# Patient Record
Sex: FEMALE | Race: WHITE | URBAN - METROPOLITAN AREA
[De-identification: names, ages, dates, MRNs, and addresses within clinical notes are randomized per-mention and may not be internally consistent; named-entity substitution may affect disease eponyms.]

---

## 2018-02-06 ENCOUNTER — TRANSFERRED RECORDS (OUTPATIENT)
Dept: HEALTH INFORMATION MANAGEMENT | Facility: CLINIC | Age: 63
End: 2018-02-06

## 2018-03-27 ENCOUNTER — TRANSFERRED RECORDS (OUTPATIENT)
Dept: HEALTH INFORMATION MANAGEMENT | Facility: CLINIC | Age: 63
End: 2018-03-27

## 2018-03-29 ENCOUNTER — TRANSFERRED RECORDS (OUTPATIENT)
Dept: HEALTH INFORMATION MANAGEMENT | Facility: CLINIC | Age: 63
End: 2018-03-29

## 2018-04-04 ENCOUNTER — TRANSFERRED RECORDS (OUTPATIENT)
Dept: HEALTH INFORMATION MANAGEMENT | Facility: CLINIC | Age: 63
End: 2018-04-04

## 2018-04-17 ENCOUNTER — MEDICAL CORRESPONDENCE (OUTPATIENT)
Dept: HEALTH INFORMATION MANAGEMENT | Facility: CLINIC | Age: 63
End: 2018-04-17

## 2018-04-27 DIAGNOSIS — M51.26 LUMBAR DISC HERNIATION: Primary | ICD-10-CM

## 2018-04-29 ASSESSMENT — ENCOUNTER SYMPTOMS
MUSCLE CRAMPS: 0
BACK PAIN: 1
JOINT SWELLING: 0
NECK PAIN: 1
MYALGIAS: 1
STIFFNESS: 1
ARTHRALGIAS: 1
MUSCLE WEAKNESS: 1

## 2018-05-01 ENCOUNTER — HEALTH MAINTENANCE LETTER (OUTPATIENT)
Age: 63
End: 2018-05-01

## 2018-05-03 ENCOUNTER — OFFICE VISIT (OUTPATIENT)
Dept: NEUROSURGERY | Facility: CLINIC | Age: 63
End: 2018-05-03
Payer: COMMERCIAL

## 2018-05-03 ENCOUNTER — RADIANT APPOINTMENT (OUTPATIENT)
Dept: GENERAL RADIOLOGY | Facility: CLINIC | Age: 63
End: 2018-05-03
Attending: NEUROLOGICAL SURGERY
Payer: COMMERCIAL

## 2018-05-03 VITALS
HEART RATE: 66 BPM | HEIGHT: 61 IN | SYSTOLIC BLOOD PRESSURE: 122 MMHG | WEIGHT: 144.2 LBS | BODY MASS INDEX: 27.23 KG/M2 | DIASTOLIC BLOOD PRESSURE: 77 MMHG

## 2018-05-03 DIAGNOSIS — M43.16 SPONDYLOLISTHESIS AT L4-L5 LEVEL: Primary | ICD-10-CM

## 2018-05-03 DIAGNOSIS — M48.02 SPINAL STENOSIS IN CERVICAL REGION: Primary | ICD-10-CM

## 2018-05-03 DIAGNOSIS — M71.38 SYNOVIAL CYST OF LUMBAR FACET JOINT: ICD-10-CM

## 2018-05-03 DIAGNOSIS — M51.26 LUMBAR DISC HERNIATION: ICD-10-CM

## 2018-05-03 RX ORDER — HYDROCHLOROTHIAZIDE 25 MG/1
25 TABLET ORAL EVERY MORNING
COMMUNITY
Start: 2017-12-26

## 2018-05-03 RX ORDER — COVID-19 ANTIGEN TEST
KIT MISCELLANEOUS DAILY PRN
COMMUNITY
End: 2018-07-16

## 2018-05-03 RX ORDER — LOSARTAN POTASSIUM 50 MG/1
50 TABLET ORAL EVERY MORNING
COMMUNITY
Start: 2018-01-03

## 2018-05-03 RX ORDER — METOPROLOL TARTRATE 50 MG
50 TABLET ORAL 2 TIMES DAILY
COMMUNITY

## 2018-05-03 ASSESSMENT — PAIN SCALES - GENERAL: PAINLEVEL: MILD PAIN (3)

## 2018-05-03 NOTE — PROGRESS NOTES
Neurosurgery Clinic Note    Chief Complaint: low back and right leg pain    History of Present Illness:  It was a pleasure to evaluate Kate Hartman in clinic today at the kind referral of José Cid  [facility]  [address] [place] [id number].    Kate Hartman is a 62 year old female presenting with pain radiating to right lateral thigh and right lateral and anterior calf, exacerbated by standing and walking, relieved 50% with right L4-L5 transfemoral epidural steroid injection and relieved with sitting and rest. Patient also has long-standing bilateral hand numbness and tingling when awakening from sleep and long-standing neck pain. She has no recent cervical spine imaging.exacerbated by standing and walking, relieved 50% with right L4-L5 transforaminal epidural steroid injection and relieved with sitting and rest.  Patient also has long-standing bilateral hand numbness and tingling when awakening from sleep and long-standing neck pain.  She has no recent cervical spine imaging.    Initially her low back pain, lumbar radiculopathy, hip pain, and shoulder pain were treated with prednisone and physical therapy by her primary care doctor with some improvement. She then has had worsening of symptoms.  Records reviewed- right lumbar 4 lumbar 5 transforaminal epidural steroid injection 4/4/2018 at [facility]  Has done prior physical therapy- notes reviewed from [facility] physical therapy on 10/19/2016 for chronic neck and back pain with radicular symptoms in legs    Review of Systems     Answers for HPI/ROS submitted by the patient on 4/29/2018   General Symptoms: No  Skin Symptoms: No  HENT Symptoms: No  EYE SYMPTOMS: No  HEART SYMPTOMS: No  LUNG SYMPTOMS: No  INTESTINAL SYMPTOMS: No  URINARY SYMPTOMS: No  GYNECOLOGIC SYMPTOMS: No  BREAST SYMPTOMS: No  SKELETAL SYMPTOMS: Yes  BLOOD SYMPTOMS: No  NERVOUS SYSTEM SYMPTOMS: No  MENTAL HEALTH SYMPTOMS: No  Back pain:  Yes  Muscle aches: Yes  Neck pain: Yes  Swollen joints: No  Joint pain: Yes  Bone pain: No  Muscle cramps: No  Muscle weakness: Yes  Joint stiffness: Yes  Bone fracture: Yes      Past medical history- hypertension former cigarette smoker    Past surgical history rotator cuff repair, loop electrosurgical excision procedure (LEEP) of cervix, breast augmentation    Social History     Social History     Marital status: Single     Spouse name: N/A     Number of children: N/A     Years of education: N/A     Social History Main Topics     Smoking status: Not on file     Smokeless tobacco: Not on file     Alcohol use Not on file     Drug use: Not on file     Sexual activity: Not on file     Other Topics Concern     Not on file     Social History Narrative     Family history: No scoliosis or inheritable spine disorders      IMAGING per my own measurement and interpretation:  Xrays:lumbar flexion-extension 5/3/18,    Pelvic Incidence: 63 degrees  Lumbar Lordosis: 45 degrees  PI-LL mismatch: 18 degrees  Scoliosis:none      MRI lumbar spine 3/29/2018 grade 1 L4-L5 spondylolisthesis with severe bilateral facet arthropathy and severe central stenosis as well as severe right foraminal stenosis and moderately severe left foraminal stenosis. There is a synovial cyst at either the distal aspect of the right L4-L5 facet or possibly the proximal aspect of the right L5-S1 facet, it is difficult to distinguish based on the MRI, the radiologist does not comment on it but it is certainly there      Resulted Imaging/Labs:  Bone Density:  No results found.    Vitamin D:  Vitamin D Deficiency Screening Results:  No results found for: VITDT  No results found for: ZIQ668, ANIJ080, [id number], VITD3, D2VIT, D3VIT, DTOT, [id number], [id number], [id number], [id number], [id number], [id number]      Nutritional Status:  There is no height or weight on file to calculate BMI.    No results found for: ALBUMIN    Diabetes Screening:  No results found  "for: A1C    Nicotine Usage:    No, former                Physical Exam   /77 (BP Location: Left arm, Patient Position: Chair, Cuff Size: Adult Regular)  Pulse 66  Ht 1.549 m (5' 1\")  Wt 65.4 kg (144 lb 3.2 oz)  BMI 27.25 kg/m2    Constitutional: Oriented to person, place, and time. Appears well-developed and well-nourished. Cooperative. No distress.   HENT:   Head: Normocephalic and atraumatic.   Eyes: Conjunctivae are normal.  Neck: Normal range of motion. Neck supple. No spinous process tenderness and no muscular tenderness present. No tracheal deviation present.  Cardiovascular: Normal rate and regular rhythm.    Pulmonary/Chest: Effort normal and breath sounds normal.  Abdominal: Soft. Bowel sounds are normal. Exhibits no distension. There is no tenderness.   Musculoskeletal:   Cervical flexion-extension range of motion: normal  Lumbar flexion/extension range of motion: limited due to pain    Neurological: alert and oriented to person, place, and time.   No cranial nerve deficit   sensory deficit none  Gait normal      Reflex Scores:            Bicep reflexes are 2+ on the right side and 2+ on the left side.       Brachioradialis reflexes are 2+ on the right side and 2+ on the left side.       Patellar reflexes are 1+ on the right side and 1+ on the left side.       Achilles reflexes are 1+ on the right side and 1+ on the left side.    STRENGTH LEFT RIGHT   Deltoid 5 5   Bicep 5 5   Wrist Extensor 5 5   Tricep 5 5   Finger flexion 5 5   Finger abduction 5 5    5 5       Hip Flexion     5     5   Knee Extension 5 5   Ankle Dorsiflexion 5 5   Extensor Hallucis Longus 5 5   Plantar Flexion 5 5   Foot eversion 5 5   Foot inversion 5 5     No Lhermitte's, No Spurling's  No Roberto's   No ankle clonus          Skin: Skin is warm, dry and intact.   Psychiatric: Normal mood and affect. Speech is normal and behavior is normal.        ASSESSMENT:  Kate Hartman is a 62 year old female with L4-L5 " spondylolisthesis, stenosis, and synovial cyst right L5, pelvic incidence to lumbar lordosis mismatch of 18 degrees    PLAN:      I recommended a posterior approach decompression and fusion with L4-5 TLIF due to spondylolisthesis and L5 synovial cyst, with bilateral Smith-Cespedes osteotomies for hypolordosis correction. She may need a right L5-S1 hemilaminectomy to fully resect that synovial cyst.    Due to positional hands numbness and tingling, I recommended an MRI of the cervical spine to rule out cervical central stenosis and cord compression  which is relevant for prone positioning for lumbar spine surgery.      I discussed surgical risk including bleeding, infection, nerve or spinal cord damage, failure to heal/failure to form fusion/instrumentation failure, CSF leak, weakness/paralysis, numbness, worsening pain or failure to improve including neuropathic pain, recurrence of problem, and potential for development of instability or adjacent segment disease, and potential need for further procedures or surgeries. I discussed potential of failure to improve or even worsening despite surgery.    We will check serum vitamin D level to ensure no deficiencies prior to fusion surgery.    I discussed the risks of general anesthesia including stroke, heart attack, pneumonia, prolonged intubation, blood clot, pulmonary embolism, and urinary tract infection. I discussed risks of positioning including pressure ulcerations, skin tears or abrasions, pressure neuropathies, or even rarely blindness.    The patient is not sure if she wants to proceed and will call us if she wants to do so.          Fartun Ch MD    [facility] Department of Neurosurgery  Complex Spinal Deformity, Scoliosis, and Minimally Invasive Spine Surgery Specialist  Office: [phone]    5/3/2018  12:51 PM      I spent 45 minutes in patient care with greater than 50% spent in counseling and/or coordination of  care.    I performed independent visualization of radiographic imaging and entered my own interpretation, reviewed and/or ordered tests in radiology, made the decision to obtain old records and/or history from someone other than the patient and Reviewed and summarized old records and/or discussed this case with another health care provider

## 2018-05-03 NOTE — LETTER
5/3/2018       RE: Kate Hartman  [address]  [place] [address]     Dear Colleague,    Thank you for referring your patient, Kate Hartman, to the [facility] NEUROSURGERY at [facility]. Please see a copy of my visit note below.        Neurosurgery Clinic Note    Chief Complaint: low back and right leg pain    History of Present Illness:  It was a pleasure to evaluate Kate Hartman in clinic today at the kind referral of José Cid  [place] [facility]  [address] [place] [address].    Kate Hartman is a 62 year old female presenting with pain radiating to right lateral thigh and right lateral and anterior calf, exacerbated by standing and walking, relieved 50% with right L4-L5 transfemoral epidural steroid injection and relieved with sitting and rest. Patient also has long-standing bilateral hand numbness and tingling when awakening from sleep and long-standing neck pain. She has no recent cervical spine imaging.exacerbated by standing and walking, relieved 50% with right L4-L5 transforaminal epidural steroid injection and relieved with sitting and rest.  Patient also has long-standing bilateral hand numbness and tingling when awakening from sleep and long-standing neck pain.  She has no recent cervical spine imaging.    Initially her low back pain, lumbar radiculopathy, hip pain, and shoulder pain were treated with prednisone and physical therapy by her primary care doctor with some improvement. She then has had worsening of symptoms.  Records reviewed- right lumbar 4 lumbar 5 transforaminal epidural steroid injection 4/4/2018 at [facility]  Has done prior physical therapy- notes reviewed from [facility] physical therapy on 10/19/2016 for chronic neck and back pain with radicular symptoms in legs    Review of Systems     Answers for HPI/ROS submitted by the patient on 4/29/2018   General Symptoms: No  Skin Symptoms: No  HENT  Symptoms: No  EYE SYMPTOMS: No  HEART SYMPTOMS: No  LUNG SYMPTOMS: No  INTESTINAL SYMPTOMS: No  URINARY SYMPTOMS: No  GYNECOLOGIC SYMPTOMS: No  BREAST SYMPTOMS: No  SKELETAL SYMPTOMS: Yes  BLOOD SYMPTOMS: No  NERVOUS SYSTEM SYMPTOMS: No  MENTAL HEALTH SYMPTOMS: No  Back pain: Yes  Muscle aches: Yes  Neck pain: Yes  Swollen joints: No  Joint pain: Yes  Bone pain: No  Muscle cramps: No  Muscle weakness: Yes  Joint stiffness: Yes  Bone fracture: Yes      Past medical history- hypertension former cigarette smoker    Past surgical history rotator cuff repair, loop electrosurgical excision procedure (LEEP) of cervix, breast augmentation    Social History     Social History     Marital status: Single     Spouse name: N/A     Number of children: N/A     Years of education: N/A     Social History Main Topics     Smoking status: Not on file     Smokeless tobacco: Not on file     Alcohol use Not on file     Drug use: Not on file     Sexual activity: Not on file     Other Topics Concern     Not on file     Social History Narrative     Family history: No scoliosis or inheritable spine disorders      IMAGING per my own measurement and interpretation:  Xrays:lumbar flexion-extension 5/3/18,    Pelvic Incidence: 63 degrees  Lumbar Lordosis: 45 degrees  PI-LL mismatch: 18 degrees  Scoliosis:none      MRI lumbar spine 3/29/2018 grade 1 L4-L5 spondylolisthesis with severe bilateral facet arthropathy and severe central stenosis as well as severe right foraminal stenosis and moderately severe left foraminal stenosis. There is a synovial cyst at either the distal aspect of the right L4-L5 facet or possibly the proximal aspect of the right L5-S1 facet, it is difficult to distinguish based on the MRI, the radiologist does not comment on it but it is certainly there      Resulted Imaging/Labs:  Bone Density:  No results found.    Vitamin D:  Vitamin D Deficiency Screening Results:  No results found for: VITDT  No results found for: HAF393,  "LKFR129, [id number], VITD3, D2VIT, D3VIT, DTOT, [id number], [id number], [id number], [id number], [id number], [id number]      Nutritional Status:  There is no height or weight on file to calculate BMI.    No results found for: ALBUMIN    Diabetes Screening:  No results found for: A1C    Nicotine Usage:    No, former                Physical Exam   /77 (BP Location: Left arm, Patient Position: Chair, Cuff Size: Adult Regular)  Pulse 66  Ht 1.549 m (5' 1\")  Wt 65.4 kg (144 lb 3.2 oz)  BMI 27.25 kg/m2    Constitutional: Oriented to person, place, and time. Appears well-developed and well-nourished. Cooperative. No distress.   HENT:   Head: Normocephalic and atraumatic.   Eyes: Conjunctivae are normal.  Neck: Normal range of motion. Neck supple. No spinous process tenderness and no muscular tenderness present. No tracheal deviation present.  Cardiovascular: Normal rate and regular rhythm.    Pulmonary/Chest: Effort normal and breath sounds normal.  Abdominal: Soft. Bowel sounds are normal. Exhibits no distension. There is no tenderness.   Musculoskeletal:   Cervical flexion-extension range of motion: normal  Lumbar flexion/extension range of motion: limited due to pain    Neurological: alert and oriented to person, place, and time.   No cranial nerve deficit   sensory deficit none  Gait normal      Reflex Scores:            Bicep reflexes are 2+ on the right side and 2+ on the left side.       Brachioradialis reflexes are 2+ on the right side and 2+ on the left side.       Patellar reflexes are 1+ on the right side and 1+ on the left side.       Achilles reflexes are 1+ on the right side and 1+ on the left side.    STRENGTH LEFT RIGHT   Deltoid 5 5   Bicep 5 5   Wrist Extensor 5 5   Tricep 5 5   Finger flexion 5 5   Finger abduction 5 5    5 5       Hip Flexion     5     5   Knee Extension 5 5   Ankle Dorsiflexion 5 5   Extensor Hallucis Longus 5 5   Plantar Flexion 5 5   Foot eversion 5 5   Foot inversion 5 " 5     No Lhermitte's, No Spurling's  No Roberto's   No ankle clonus          Skin: Skin is warm, dry and intact.   Psychiatric: Normal mood and affect. Speech is normal and behavior is normal.        ASSESSMENT:  Kate Hartman is a 62 year old female with L4-L5 spondylolisthesis, stenosis, and synovial cyst right L5, pelvic incidence to lumbar lordosis mismatch of 18 degrees    PLAN:      I recommended a posterior approach decompression and fusion with L4-5 TLIF due to spondylolisthesis and L5 synovial cyst, with bilateral Smith-Cespedes osteotomies for hypolordosis correction. She may need a right L5-S1 hemilaminectomy to fully resect that synovial cyst.    Due to positional hands numbness and tingling, I recommended an MRI of the cervical spine to rule out cervical central stenosis and cord compression  which is relevant for prone positioning for lumbar spine surgery.      I discussed surgical risk including bleeding, infection, nerve or spinal cord damage, failure to heal/failure to form fusion/instrumentation failure, CSF leak, weakness/paralysis, numbness, worsening pain or failure to improve including neuropathic pain, recurrence of problem, and potential for development of instability or adjacent segment disease, and potential need for further procedures or surgeries. I discussed potential of failure to improve or even worsening despite surgery.    We will check serum vitamin D level to ensure no deficiencies prior to fusion surgery.    I discussed the risks of general anesthesia including stroke, heart attack, pneumonia, prolonged intubation, blood clot, pulmonary embolism, and urinary tract infection. I discussed risks of positioning including pressure ulcerations, skin tears or abrasions, pressure neuropathies, or even rarely blindness.    The patient is not sure if she wants to proceed and will call us if she wants to do so.        I spent 45 minutes in patient care with greater than 50% spent in  counseling and/or coordination of care.    I performed independent visualization of radiographic imaging and entered my own interpretation, reviewed and/or ordered tests in radiology, made the decision to obtain old records and/or history from someone other than the patient and Reviewed and summarized old records and/or discussed this case with another health care provider        Again, thank you for allowing me to participate in the care of your patient.      Sincerely,    Fartun Ch MD

## 2018-05-03 NOTE — MR AVS SNAPSHOT
"              After Visit Summary   5/3/2018    Kate Hartman    MRN: [id number]           Patient Information     Date Of Birth          1955        Visit Information        Provider Department      5/3/2018 1:45 PM Fartun Ch MD [facility] Neurosurgery        Today's Diagnoses     Spondylolisthesis at L4-L5 level    -  1    Synovial cyst of lumbar facet joint           Follow-ups after your visit        Who to contact     Please call your clinic at [phone] to:    Ask questions about your health    Make or cancel appointments    Discuss your medicines    Learn about your test results    Speak to your doctor            Additional Information About Your Visit        [employer] Information     [email or web address] gives you secure access to your electronic health record. If you see a primary care provider, you can also send messages to your care team and make appointments. If you have questions, please call your primary care clinic.  If you do not have a primary care provider, please call [phone] and they will assist you.      [email or web address] is an electronic gateway that provides easy, online access to your medical records. With [email or web address], you can request a clinic appointment, read your test results, renew a prescription or communicate with your care team.     To access your existing account, please contact your [facility] or call [phone] for assistance.        Care EveryWhere ID     This is your Care EveryWhere ID. This could be used by other organizations to access your [facility] medical records  [id number]        Your Vitals Were     Pulse Height BMI (Body Mass Index)             66 1.549 m (5' 1\") 27.25 kg/m2          Blood Pressure from Last 3 Encounters:   05/03/18 122/77    Weight from Last 3 Encounters:   05/03/18 65.4 kg (144 lb 3.2 oz)              Today, you had the following     No orders found for display       Primary Care Provider    None Specified       No " primary provider on file.        Equal Access to Services     BRIEN BOSS : Hadii aad ku hadjosedebbie Gonzalez, waabefederico cortes, qakasiariky ibrahimtimasol miller. So [facility] [phone].    ATENCIÓN: Si habla español, tiene a burnett disposición servicios gratuitos de asistencia lingüística. Llame al [phone].    We comply with applicable federal civil rights laws and [place] laws. We do not discriminate on the basis of race, color, national origin, age, disability, sex, sexual orientation, or gender identity.            Thank you!     Thank you for choosing [facility]  for your care. Our goal is always to provide you with excellent care. Hearing back from our patients is one way we can continue to improve our services. Please take a few minutes to complete the written survey that you may receive in the mail after your visit with us. Thank you!             Your Updated Medication List - Protect others around you: Learn how to safely use, store and throw away your medicines at www.disposemymeds.org.          This list is accurate as of 5/3/18  2:35 PM.  Always use your most recent med list.                   Brand Name Dispense Instructions for use Diagnosis    DULOXETINE HCL PO      daily        hydrochlorothiazide 25 MG tablet    HYDRODIURIL     Take 25 mg by mouth daily        losartan 50 MG tablet    COZAAR     Take 50 mg by mouth daily        metoprolol tartrate 50 MG tablet    LOPRESSOR     Take 50 mg by mouth 2 times daily        naproxen sodium 220 MG capsule      daily as needed

## 2018-05-09 ENCOUNTER — RADIANT APPOINTMENT (OUTPATIENT)
Dept: MRI IMAGING | Facility: CLINIC | Age: 63
End: 2018-05-09
Attending: NEUROLOGICAL SURGERY
Payer: COMMERCIAL

## 2018-05-09 DIAGNOSIS — M48.02 SPINAL STENOSIS IN CERVICAL REGION: ICD-10-CM

## 2018-05-09 PROCEDURE — 72141 MRI NECK SPINE W/O DYE: CPT | Performed by: RADIOLOGY

## 2018-05-16 ENCOUNTER — TELEPHONE (OUTPATIENT)
Dept: NEUROSURGERY | Facility: CLINIC | Age: 63
End: 2018-05-16

## 2018-05-16 NOTE — TELEPHONE ENCOUNTER
Pt called to let us know that she has had her MRI done and wanted another appointment with Dr. Ch.  Writer will send a not to the  to put her on Dr. Ch' schedule.

## 2018-05-31 ENCOUNTER — OFFICE VISIT (OUTPATIENT)
Dept: NEUROSURGERY | Facility: CLINIC | Age: 63
End: 2018-05-31
Payer: COMMERCIAL

## 2018-05-31 VITALS
SYSTOLIC BLOOD PRESSURE: 167 MMHG | BODY MASS INDEX: 26.94 KG/M2 | WEIGHT: 142.7 LBS | HEIGHT: 61 IN | HEART RATE: 67 BPM | DIASTOLIC BLOOD PRESSURE: 67 MMHG

## 2018-05-31 DIAGNOSIS — M43.16 SPONDYLOLISTHESIS AT L4-L5 LEVEL: ICD-10-CM

## 2018-05-31 DIAGNOSIS — M48.02 SPINAL STENOSIS IN CERVICAL REGION: Primary | ICD-10-CM

## 2018-05-31 DIAGNOSIS — E55.9 VITAMIN D DEFICIENCY: ICD-10-CM

## 2018-05-31 DIAGNOSIS — E55.9 VITAMIN D DEFICIENCY: Primary | ICD-10-CM

## 2018-05-31 DIAGNOSIS — M71.38 SYNOVIAL CYST OF LUMBAR FACET JOINT: ICD-10-CM

## 2018-05-31 ASSESSMENT — PAIN SCALES - GENERAL: PAINLEVEL: MODERATE PAIN (4)

## 2018-05-31 NOTE — LETTER
5/31/2018       RE: Ktae Hartman  [address] [facility]  [place] [address]     Dear Colleague,    Thank you for referring your patient, Kate Hartman, to the [facility] NEUROSURGERY at [facility]. Please see a copy of my visit note below.    Neurosurgery Clinic Note     Chief Complaint: low back and right leg pain     History of Present Illness:  It was a pleasure to evaluate Kate Hartman in clinic today at the kind referral of [facility]  [address] [place] [address].     Kate Hartman is a 62 year old female who is here to follow up on cervical spine MRI obtained to evaluate occasional hand numbness awakening her from sleeep prior to proceeding with lumbar surgery out of positioning concerns. She previously has had a negative upper extremity EMG per her report. MRI cervical spine shows moderate stenosis at C6-7 and degenerative changes most significant at C4-5 and C56. She denies any arm numbness or weakness, and no position of her neck including maximal extension elicits any symptoms in her hands or arms.        She is presenting with pain radiating to right lateral thigh and right lateral and anterior calf, exacerbated by standing and walking, relieved 50% with right L4-L5 transfemoral epidural steroid injection and relieved with sitting and rest. Patient also has long-standing bilateral hand numbness and tingling when awakening from sleep and long-standing neck pain. She has no recent cervical spine imaging.exacerbated by standing and walking, relieved 50% with right L4-L5 transforaminal epidural steroid injection and relieved with sitting and rest.  Patient also has long-standing bilateral hand numbness and tingling when awakening from sleep and long-standing neck pain.  She has no recent cervical spine imaging.     Initially her low back pain, lumbar radiculopathy, hip pain, and shoulder pain were treated with prednisone and  physical therapy by her primary care doctor with some improvement. She then has had worsening of symptoms.  Records reviewed- right lumbar 4 lumbar 5 transforaminal epidural steroid injection 4/4/2018 at [facility]  Has done prior physical therapy- notes reviewed from [facility] physical therapy on 10/19/2016 for chronic neck and back pain with radicular symptoms in legs     Review of Systems      Answers for HPI/ROS submitted by the patient on 4/29/2018   General Symptoms: No  Skin Symptoms: No  HENT Symptoms: No  EYE SYMPTOMS: No  HEART SYMPTOMS: No  LUNG SYMPTOMS: No  INTESTINAL SYMPTOMS: No  URINARY SYMPTOMS: No  GYNECOLOGIC SYMPTOMS: No  BREAST SYMPTOMS: No  SKELETAL SYMPTOMS: Yes  BLOOD SYMPTOMS: No  NERVOUS SYSTEM SYMPTOMS: No  MENTAL HEALTH SYMPTOMS: No  Back pain: Yes  Muscle aches: Yes  Neck pain: Yes  Swollen joints: No  Joint pain: Yes  Bone pain: No  Muscle cramps: No  Muscle weakness: Yes  Joint stiffness: Yes  Bone fracture: Yes        Past medical history- hypertension former cigarette smoker     Past surgical history rotator cuff repair, loop electrosurgical excision procedure (LEEP) of cervix, breast augmentation     Social History            Social History     Marital status: Single       Spouse name: N/A     Number of children: N/A     Years of education: N/A           Social History Main Topics     Smoking status: Not on file     Smokeless tobacco: Not on file     Alcohol use Not on file     Drug use: Not on file     Sexual activity: Not on file           Other Topics Concern     Not on file      Social History Narrative      Family history: No scoliosis or inheritable spine disorders      IMAGING per my own measurement and interpretation:  Xrays:lumbar flexion-extension 5/3/18,     Pelvic Incidence: 63 degrees  Lumbar Lordosis: 45 degrees  PI-LL mismatch: 18 degrees  Scoliosis:none        MRI lumbar spine 3/29/2018 grade 1 L4-L5 spondylolisthesis with severe bilateral facet  "arthropathy and severe central stenosis as well as severe right foraminal stenosis and moderately severe left foraminal stenosis. There is a synovial cyst at either the distal aspect of the right L4-L5 facet or possibly the proximal aspect of the right L5-S1 facet, it is difficult to distinguish based on the MRI, the radiologist does not comment on it but it is certainly there     MRI cervical spine 5/9/18:  C4-5:  Anterolisthesis, disc bulge, central disc protrusion left  greater than right facet hypertrophy. Mild spinal canal narrowing.  Bilateral mild neural foraminal narrowing.     C5-6:  There is posterior disc osteophyte complex. Mild spinal canal  narrowing. Moderate right and mild to moderate left neural foraminal  stenosis.     C6-7:  Disc bulge and superimposed right central disc protrusion.  Bilateral uncinate hypertrophy. Resultant moderate canal stenosis.  Bilateral moderate to severe foraminal stenosis.         Resulted Imaging/Labs:  Bone Density:  No results found.       No results found for: WSI021, IZMR851, [id number], VITD3, D2VIT, D3VIT, DTOT, [id number], [id number], [id number], [id number], [id number], [id number]   Nutritional Status:  There is no height or weight on file to calculate BMI.     No results found for: ALBUMIN     Diabetes Screening:  No results found for: A1C     Nicotine Usage:     No, former                      Physical Exam   /77 (BP Location: Left arm, Patient Position: Chair, Cuff Size: Adult Regular)  Pulse 66  Ht 1.549 m (5' 1\")  Wt 65.4 kg (144 lb 3.2 oz)  BMI 27.25 kg/m2     Constitutional: Oriented to person, place, and time. Appears well-developed and well-nourished. Cooperative. No distress.   HENT:   Head: Normocephalic and atraumatic.   Eyes: Conjunctivae are normal.  Neck: Normal range of motion. Neck supple. No spinous process tenderness and no muscular tenderness present. No tracheal deviation present.  Cardiovascular: Normal rate and regular rhythm.  "   Pulmonary/Chest: Effort normal and breath sounds normal.  Abdominal: Soft. Bowel sounds are normal. Exhibits no distension. There is no tenderness.   Musculoskeletal:   Cervical flexion-extension range of motion: normal  Lumbar flexion/extension range of motion: limited due to pain    Neurological: alert and oriented to person, place, and time.   No cranial nerve deficit   sensory deficit none  Gait normal        Reflex Scores:            Bicep reflexes are 2+ on the right side and 2+ on the left side.       Brachioradialis reflexes are 2+ on the right side and 2+ on the left side.       Patellar reflexes are 1+ on the right side and 1+ on the left side.       Achilles reflexes are 1+ on the right side and 1+ on the left side.     STRENGTH LEFT RIGHT   Deltoid 5 5   Bicep 5 5   Wrist Extensor 5 5   Tricep 5 5   Finger flexion 5 5   Finger abduction 5 5    5 5         Hip Flexion       5       5   Knee Extension 5 5   Ankle Dorsiflexion 5 5   Extensor Hallucis Longus 5 5   Plantar Flexion 5 5   Foot eversion 5 5   Foot inversion 5 5     No Lhermitte's, No Spurling's  No Roberto's   No ankle clonus         Skin: Skin is warm, dry and intact.   Psychiatric: Normal mood and affect. Speech is normal and behavior is normal.           ASSESSMENT:  Kate Hartman is a 62 year old female with cervical stenosis without myelopathy, L4-L5 spondylolisthesis, stenosis, and synovial cyst right L5, pelvic incidence to lumbar lordosis mismatch of 18 degrees     PLAN:        I recommended a posterior approach decompression and fusion with L4-5 TLIF due to spondylolisthesis and L5 synovial cyst, with bilateral Smith-Cespedes osteotomies for hypolordosis correction. She may need a right L5-S1 hemilaminectomy to fully resect that synovial cyst.     Due to cervical stenosis without myelopathy and concern for cord compression during prone positioning for lumbar spine surgery, I discussed options including cervical spine surgery  (she has no direct symptoms and this would be purely prophylactic) versus neuromonitoring with SSEPs and MEPs (around 90% accuracy for detecting spinal cord compression in cervical spine) and arterial line to keep mean arterial pressure closely monitored, versus doing nothing regarding her cervical spine stenosis.    She chose using neuromonitoring, which is what I think is most reasonable. This is not perfectly able to keep her completely without risk for her neck, which she was able to state understanding for, and she also understands that we may need to stop surgery on her low back with incomplete procedure if we detected a problem with her neck during surgery.       I discussed surgical risk including bleeding, infection, nerve or spinal cord damage, failure to heal/failure to form fusion/instrumentation failure, CSF leak, weakness/paralysis, numbness, worsening pain or failure to improve including neuropathic pain, recurrence of problem, and potential for development of instability or adjacent segment disease, and potential need for further procedures or surgeries. I discussed potential of failure to improve or even worsening despite surgery.     We will check serum vitamin D level to ensure no deficiencies prior to fusion surgery. She will see PAC for preop risk assessment.    SURGICAL PLAN-     Stealth-guided lumbar 4-5 decompression with resection of synovial cyst and bilateral transforaminal interbody fusion with Rodriguez-Cespedes osteotomies, autograft, and possible allograft, neuromonitoring due to cervical spine stenosis.  Arterial line for blood pressure monitoring      The patient understands and wishes to proceed            Fartun Ch MD    [facility] Department of Neurosurgery  Complex Spinal Deformity, Scoliosis, and Minimally Invasive Spine Surgery Specialist  Office: [phone]     I spent 40 minutes in patient care with greater than 50% spent in counseling  and/or coordination of care.      Again, thank you for allowing me to participate in the care of your patient.      Sincerely,    Fartun Ch MD

## 2018-05-31 NOTE — MR AVS SNAPSHOT
"              After Visit Summary   5/31/2018    Kate Hartman    MRN: [id number]           Patient Information     Date Of Birth          1955        Visit Information        Provider Department      5/31/2018 2:45 PM Fartun Ch MD [facility] Neurosurgery        Today's Diagnoses     Spinal stenosis in cervical region    -  1    Spondylolisthesis at L4-L5 level        Synovial cyst of lumbar facet joint           Follow-ups after your visit        Future tests that were ordered for you today     Open Future Orders        Priority Expected Expires Ordered    25 Hydroxyvitamin D2 and D3 Routine 5/31/2018 6/1/2019 5/31/2018            Who to contact     Please call your clinic at [phone] to:    Ask questions about your health    Make or cancel appointments    Discuss your medicines    Learn about your test results    Speak to your doctor            Additional Information About Your Visit        MyChart Information     [employer] gives you secure access to your electronic health record. If you see a primary care provider, you can also send messages to your care team and make appointments. If you have questions, please call your primary care clinic.  If you do not have a primary care provider, please call [phone] and they will assist you.      [employer] is an electronic gateway that provides easy, online access to your medical records. With [employer], you can request a clinic appointment, read your test results, renew a prescription or communicate with your care team.     To access your existing account, please contact your [facility] or call [phone] for assistance.        Care EveryWhere ID     This is your Care EveryWhere ID. This could be used by other organizations to access your [place] medical records  [id number]        Your Vitals Were     Pulse Height BMI (Body Mass Index)             67 1.549 m (5' 1\") 26.96 kg/m2          Blood Pressure from " Last 3 Encounters:   05/31/18 167/67   05/03/18 122/77    Weight from Last 3 Encounters:   05/31/18 64.7 kg (142 lb 11.2 oz)   05/03/18 65.4 kg (144 lb 3.2 oz)              We Performed the Following     Desrie-Operative Worksheet          Today's Medication Changes          These changes are accurate as of 5/31/18  3:37 PM.  If you have any questions, ask your nurse or doctor.               Stop taking these medicines if you haven't already. Please contact your care team if you have questions.     DULOXETINE HCL PO   Stopped by:  Fartun Ch MD                    Primary Care Provider    None Specified       No primary provider on file.        Equal Access to Services     [facility]: Azalea Gonzalez, jani cortes, shari estrada, sol esteban . So [facility] [phone].    ATENCIÓN: Si habla español, tiene a burnett disposición servicios gratuitos de asistencia lingüística. Llame al [phone].    We comply with applicable federal civil rights laws and [place] laws. We do not discriminate on the basis of race, color, national origin, age, disability, sex, sexual orientation, or gender identity.            Thank you!     Thank you for choosing [facility] NEUROSURGERY  for your care. Our goal is always to provide you with excellent care. Hearing back from our patients is one way we can continue to improve our services. Please take a few minutes to complete the written survey that you may receive in the mail after your visit with us. Thank you!             Your Updated Medication List - Protect others around you: Learn how to safely use, store and throw away your medicines at www.disposemymeds.org.          This list is accurate as of 5/31/18  3:37 PM.  Always use your most recent med list.                   Brand Name Dispense Instructions for use Diagnosis    hydrochlorothiazide 25 MG tablet    HYDRODIURIL     Take 25 mg by mouth daily        losartan  50 MG tablet    COZAAR     Take 25 mg by mouth daily        metoprolol tartrate 50 MG tablet    LOPRESSOR     Take 50 mg by mouth 2 times daily        naproxen sodium 220 MG capsule      daily as needed

## 2018-05-31 NOTE — PROGRESS NOTES
Neurosurgery Clinic Note     Chief Complaint: low back and right leg pain     History of Present Illness:  It was a pleasure to evaluate Kate Hartman in clinic today at the kind referral of [facility]  [facility] [place] [address] [place] [address].     Kate Hartman is a 62 year old female who is here to follow up on cervical spine MRI obtained to evaluate occasional hand numbness awakening her from sleeep prior to proceeding with lumbar surgery out of positioning concerns. She previously has had a negative upper extremity EMG per her report. MRI cervical spine shows moderate stenosis at C6-7 and degenerative changes most significant at C4-5 and C56. She denies any arm numbness or weakness, and no position of her neck including maximal extension elicits any symptoms in her hands or arms.        She is presenting with pain radiating to right lateral thigh and right lateral and anterior calf, exacerbated by standing and walking, relieved 50% with right L4-L5 transfemoral epidural steroid injection and relieved with sitting and rest. Patient also has long-standing bilateral hand numbness and tingling when awakening from sleep and long-standing neck pain. She has no recent cervical spine imaging.exacerbated by standing and walking, relieved 50% with right L4-L5 transforaminal epidural steroid injection and relieved with sitting and rest.  Patient also has long-standing bilateral hand numbness and tingling when awakening from sleep and long-standing neck pain.  She has no recent cervical spine imaging.     Initially her low back pain, lumbar radiculopathy, hip pain, and shoulder pain were treated with prednisone and physical therapy by her primary care doctor with some improvement. She then has had worsening of symptoms.  Records reviewed- right lumbar 4 lumbar 5 transforaminal epidural steroid injection 4/4/2018 at [facility]  Has done prior physical therapy- notes reviewed from  [facility] physical therapy on 10/19/2016 for chronic neck and back pain with radicular symptoms in legs     Review of Systems      Answers for HPI/ROS submitted by the patient on 4/29/2018   General Symptoms: No  Skin Symptoms: No  HENT Symptoms: No  EYE SYMPTOMS: No  HEART SYMPTOMS: No  LUNG SYMPTOMS: No  INTESTINAL SYMPTOMS: No  URINARY SYMPTOMS: No  GYNECOLOGIC SYMPTOMS: No  BREAST SYMPTOMS: No  SKELETAL SYMPTOMS: Yes  BLOOD SYMPTOMS: No  NERVOUS SYSTEM SYMPTOMS: No  MENTAL HEALTH SYMPTOMS: No  Back pain: Yes  Muscle aches: Yes  Neck pain: Yes  Swollen joints: No  Joint pain: Yes  Bone pain: No  Muscle cramps: No  Muscle weakness: Yes  Joint stiffness: Yes  Bone fracture: Yes        Past medical history- hypertension former cigarette smoker     Past surgical history rotator cuff repair, loop electrosurgical excision procedure (LEEP) of cervix, breast augmentation     Social History            Social History     Marital status: Single       Spouse name: N/A     Number of children: N/A     Years of education: N/A           Social History Main Topics     Smoking status: Not on file     Smokeless tobacco: Not on file     Alcohol use Not on file     Drug use: Not on file     Sexual activity: Not on file           Other Topics Concern     Not on file      Social History Narrative      Family history: No scoliosis or inheritable spine disorders      IMAGING per my own measurement and interpretation:  Xrays:lumbar flexion-extension 5/3/18,     Pelvic Incidence: 63 degrees  Lumbar Lordosis: 45 degrees  PI-LL mismatch: 18 degrees  Scoliosis:none        MRI lumbar spine 3/29/2018 grade 1 L4-L5 spondylolisthesis with severe bilateral facet arthropathy and severe central stenosis as well as severe right foraminal stenosis and moderately severe left foraminal stenosis. There is a synovial cyst at either the distal aspect of the right L4-L5 facet or possibly the proximal aspect of the right L5-S1 facet, it is difficult to  "distinguish based on the MRI, the radiologist does not comment on it but it is certainly there     MRI cervical spine 5/9/18:  C4-5:  Anterolisthesis, disc bulge, central disc protrusion left  greater than right facet hypertrophy. Mild spinal canal narrowing.  Bilateral mild neural foraminal narrowing.     C5-6:  There is posterior disc osteophyte complex. Mild spinal canal  narrowing. Moderate right and mild to moderate left neural foraminal  stenosis.     C6-7:  Disc bulge and superimposed right central disc protrusion.  Bilateral uncinate hypertrophy. Resultant moderate canal stenosis.  Bilateral moderate to severe foraminal stenosis.         Resulted Imaging/Labs:  Bone Density:  No results found.       No results found for: UWU258, WJVF659, [id number], VITD3, D2VIT, D3VIT, DTOT, [id number], [id number], [id number], [id number], [id number], [id number]   Nutritional Status:  There is no height or weight on file to calculate BMI.     No results found for: ALBUMIN     Diabetes Screening:  No results found for: A1C     Nicotine Usage:     No, former                      Physical Exam   /77 (BP Location: Left arm, Patient Position: Chair, Cuff Size: Adult Regular)  Pulse 66  Ht 1.549 m (5' 1\")  Wt 65.4 kg (144 lb 3.2 oz)  BMI 27.25 kg/m2     Constitutional: Oriented to person, place, and time. Appears well-developed and well-nourished. Cooperative. No distress.   HENT:   Head: Normocephalic and atraumatic.   Eyes: Conjunctivae are normal.  Neck: Normal range of motion. Neck supple. No spinous process tenderness and no muscular tenderness present. No tracheal deviation present.  Cardiovascular: Normal rate and regular rhythm.    Pulmonary/Chest: Effort normal and breath sounds normal.  Abdominal: Soft. Bowel sounds are normal. Exhibits no distension. There is no tenderness.   Musculoskeletal:   Cervical flexion-extension range of motion: normal  Lumbar flexion/extension range of motion: limited due to " pain    Neurological: alert and oriented to person, place, and time.   No cranial nerve deficit   sensory deficit none  Gait normal        Reflex Scores:            Bicep reflexes are 2+ on the right side and 2+ on the left side.       Brachioradialis reflexes are 2+ on the right side and 2+ on the left side.       Patellar reflexes are 1+ on the right side and 1+ on the left side.       Achilles reflexes are 1+ on the right side and 1+ on the left side.     STRENGTH LEFT RIGHT   Deltoid 5 5   Bicep 5 5   Wrist Extensor 5 5   Tricep 5 5   Finger flexion 5 5   Finger abduction 5 5    5 5         Hip Flexion       5       5   Knee Extension 5 5   Ankle Dorsiflexion 5 5   Extensor Hallucis Longus 5 5   Plantar Flexion 5 5   Foot eversion 5 5   Foot inversion 5 5     No Lhermitte's, No Spurling's  No Roberto's   No ankle clonus         Skin: Skin is warm, dry and intact.   Psychiatric: Normal mood and affect. Speech is normal and behavior is normal.           ASSESSMENT:  Kate Hartman is a 62 year old female with cervical stenosis without myelopathy, L4-L5 spondylolisthesis, stenosis, and synovial cyst right L5, pelvic incidence to lumbar lordosis mismatch of 18 degrees     PLAN:        I recommended a posterior approach decompression and fusion with L4-5 TLIF due to spondylolisthesis and L5 synovial cyst, with bilateral Smith-Cespedes osteotomies for hypolordosis correction. She may need a right L5-S1 hemilaminectomy to fully resect that synovial cyst.     Due to cervical stenosis without myelopathy and concern for cord compression during prone positioning for lumbar spine surgery, I discussed options including cervical spine surgery (she has no direct symptoms and this would be purely prophylactic) versus neuromonitoring with SSEPs and MEPs (around 90% accuracy for detecting spinal cord compression in cervical spine) and arterial line to keep mean arterial pressure closely monitored, versus doing nothing  regarding her cervical spine stenosis.    She chose using neuromonitoring, which is what I think is most reasonable. This is not perfectly able to keep her completely without risk for her neck, which she was able to state understanding for, and she also understands that we may need to stop surgery on her low back with incomplete procedure if we detected a problem with her neck during surgery.       I discussed surgical risk including bleeding, infection, nerve or spinal cord damage, failure to heal/failure to form fusion/instrumentation failure, CSF leak, weakness/paralysis, numbness, worsening pain or failure to improve including neuropathic pain, recurrence of problem, and potential for development of instability or adjacent segment disease, and potential need for further procedures or surgeries. I discussed potential of failure to improve or even worsening despite surgery.     We will check serum vitamin D level to ensure no deficiencies prior to fusion surgery. She will see PAC for preop risk assessment.    SURGICAL PLAN-     Stealth-guided lumbar 4-5 decompression with resection of synovial cyst and bilateral transforaminal interbody fusion with Rodriguez-Cespedes osteotomies, autograft, and possible allograft, neuromonitoring due to cervical spine stenosis.  Arterial line for blood pressure monitoring      The patient understands and wishes to proceed            Fartun Ch MD    [facility] Department of Neurosurgery  Complex Spinal Deformity, Scoliosis, and Minimally Invasive Spine Surgery Specialist  Office: [phone]     I spent 40 minutes in patient care with greater than 50% spent in counseling and/or coordination of care.

## 2018-05-31 NOTE — LETTER
Date:June 5, 2018      Patient was self referred, no letter generated. Do not send.        [facility] Health Information

## 2018-06-05 LAB
DEPRECATED CALCIDIOL+CALCIFEROL SERPL-MC: <52 UG/L (ref 20–75)
VITAMIN D2 SERPL-MCNC: <5 UG/L
VITAMIN D3 SERPL-MCNC: 47 UG/L

## 2018-06-06 DIAGNOSIS — M43.16 SPONDYLOLISTHESIS, LUMBAR REGION: Primary | ICD-10-CM

## 2018-06-06 DIAGNOSIS — R79.1 COAGULATION TEST ABNORMALITY: ICD-10-CM

## 2018-07-16 ENCOUNTER — RADIANT APPOINTMENT (OUTPATIENT)
Dept: GENERAL RADIOLOGY | Facility: CLINIC | Age: 63
End: 2018-07-16
Attending: NEUROLOGICAL SURGERY
Payer: COMMERCIAL

## 2018-07-16 ENCOUNTER — OFFICE VISIT (OUTPATIENT)
Dept: SURGERY | Facility: CLINIC | Age: 63
End: 2018-07-16
Payer: COMMERCIAL

## 2018-07-16 ENCOUNTER — ANESTHESIA EVENT (OUTPATIENT)
Dept: SURGERY | Facility: CLINIC | Age: 63
End: 2018-07-16
Payer: COMMERCIAL

## 2018-07-16 ENCOUNTER — TRANSFERRED RECORDS (OUTPATIENT)
Dept: HEALTH INFORMATION MANAGEMENT | Facility: CLINIC | Age: 63
End: 2018-07-16

## 2018-07-16 ENCOUNTER — ALLIED HEALTH/NURSE VISIT (OUTPATIENT)
Dept: SURGERY | Facility: CLINIC | Age: 63
End: 2018-07-16
Payer: COMMERCIAL

## 2018-07-16 ENCOUNTER — APPOINTMENT (OUTPATIENT)
Dept: SURGERY | Facility: CLINIC | Age: 63
End: 2018-07-16
Payer: COMMERCIAL

## 2018-07-16 VITALS
HEART RATE: 56 BPM | BODY MASS INDEX: 27.96 KG/M2 | HEIGHT: 61 IN | SYSTOLIC BLOOD PRESSURE: 164 MMHG | DIASTOLIC BLOOD PRESSURE: 103 MMHG | WEIGHT: 148.1 LBS | RESPIRATION RATE: 18 BRPM | TEMPERATURE: 98.4 F | OXYGEN SATURATION: 99 %

## 2018-07-16 DIAGNOSIS — R79.1 COAGULATION TEST ABNORMALITY: ICD-10-CM

## 2018-07-16 DIAGNOSIS — M43.16 SPONDYLOLISTHESIS, LUMBAR REGION: ICD-10-CM

## 2018-07-16 DIAGNOSIS — M43.16 SPONDYLOLISTHESIS AT L4-L5 LEVEL: ICD-10-CM

## 2018-07-16 DIAGNOSIS — Z01.818 PREOP EXAMINATION: Primary | ICD-10-CM

## 2018-07-16 LAB
ALBUMIN UR-MCNC: NEGATIVE MG/DL
ANION GAP SERPL CALCULATED.3IONS-SCNC: 9 MMOL/L (ref 3–14)
APPEARANCE UR: CLEAR
APTT PPP: 27 SEC (ref 22–37)
BACTERIA #/AREA URNS HPF: ABNORMAL /HPF
BASOPHILS # BLD AUTO: 0.1 10E9/L (ref 0–0.2)
BASOPHILS NFR BLD AUTO: 1.4 %
BILIRUB UR QL STRIP: NEGATIVE
BUN SERPL-MCNC: 10 MG/DL (ref 7–30)
CALCIUM SERPL-MCNC: 9.4 MG/DL (ref 8.5–10.1)
CHLORIDE SERPL-SCNC: 91 MMOL/L (ref 94–109)
CO2 SERPL-SCNC: 33 MMOL/L (ref 20–32)
COLOR UR AUTO: YELLOW
CREAT SERPL-MCNC: 0.84 MG/DL (ref 0.52–1.04)
DIFFERENTIAL METHOD BLD: ABNORMAL
EOSINOPHIL # BLD AUTO: 0.1 10E9/L (ref 0–0.7)
EOSINOPHIL NFR BLD AUTO: 1.4 %
ERYTHROCYTE [DISTWIDTH] IN BLOOD BY AUTOMATED COUNT: 12.1 % (ref 10–15)
GFR SERPL CREATININE-BSD FRML MDRD: 69 ML/MIN/1.7M2
GLUCOSE SERPL-MCNC: 90 MG/DL (ref 70–99)
GLUCOSE UR STRIP-MCNC: NEGATIVE MG/DL
HCT VFR BLD AUTO: 40.9 % (ref 35–47)
HGB BLD-MCNC: 13.5 G/DL (ref 11.7–15.7)
HGB UR QL STRIP: NEGATIVE
IMM GRANULOCYTES # BLD: 0 10E9/L (ref 0–0.4)
IMM GRANULOCYTES NFR BLD: 0.2 %
INR PPP: 0.9 (ref 0.86–1.14)
KETONES UR STRIP-MCNC: NEGATIVE MG/DL
LEUKOCYTE ESTERASE UR QL STRIP: ABNORMAL
LYMPHOCYTES # BLD AUTO: 2.4 10E9/L (ref 0.8–5.3)
LYMPHOCYTES NFR BLD AUTO: 41.7 %
MCH RBC QN AUTO: 34.4 PG (ref 26.5–33)
MCHC RBC AUTO-ENTMCNC: 33 G/DL (ref 31.5–36.5)
MCV RBC AUTO: 104 FL (ref 78–100)
MONOCYTES # BLD AUTO: 0.8 10E9/L (ref 0–1.3)
MONOCYTES NFR BLD AUTO: 14.3 %
MUCOUS THREADS #/AREA URNS LPF: PRESENT /LPF
NEUTROPHILS # BLD AUTO: 2.4 10E9/L (ref 1.6–8.3)
NEUTROPHILS NFR BLD AUTO: 41 %
NITRATE UR QL: NEGATIVE
NRBC # BLD AUTO: 0 10*3/UL
NRBC BLD AUTO-RTO: 0 /100
PH UR STRIP: 7 PH (ref 5–7)
PLATELET # BLD AUTO: 321 10E9/L (ref 150–450)
POTASSIUM SERPL-SCNC: 2.8 MMOL/L (ref 3.4–5.3)
RBC # BLD AUTO: 3.92 10E12/L (ref 3.8–5.2)
RBC #/AREA URNS AUTO: 1 /HPF (ref 0–2)
SODIUM SERPL-SCNC: 133 MMOL/L (ref 133–144)
SOURCE: ABNORMAL
SP GR UR STRIP: 1.01 (ref 1–1.03)
SQUAMOUS #/AREA URNS AUTO: 2 /HPF (ref 0–1)
UROBILINOGEN UR STRIP-MCNC: 0 MG/DL (ref 0–2)
WBC # BLD AUTO: 5.7 10E9/L (ref 4–11)
WBC #/AREA URNS AUTO: 2 /HPF (ref 0–5)

## 2018-07-16 RX ORDER — MULTIPLE VITAMINS W/ MINERALS TAB 9MG-400MCG
1 TAB ORAL EVERY MORNING
COMMUNITY

## 2018-07-16 NOTE — H&P
Pre-Operative H & P     CC:  Preoperative exam to assess for increased cardiopulmonary risk while undergoing surgery and anesthesia.    Date of Encounter: July 16, 2018   Primary Care Physician:  No primary care provider on file.   Reason for Visit/Surgery:  Spondylolisthesis at L4-L5 level [M43.16]      HPI  Kate Hartman is a 62 year old female who presents for pre-operative H & P in preparation for an O-Arm/Stealth Assisted Lumbar 4-5 Decompression And Bilateral Transforaminal Interbody Fusion With Rodriguez-Cespedes Osteotomies, Autograft, Possible Allograft, Neuromonitoring Due To Cervical Spine Stenosis  On 7/24/18 with Dr. Fartun Ch at the [facility].     Kate Hartman has chronic LBP starting last year radiating to the right lateral thigh and right lateral and anterior calf, exacerbated by standing and walking.  She has tried treatments with steroid injection,  prednisone and physical therapy by her primary care doctor with some improvement, but not lasting relief so the above surgery was recommended.  She does have PAC's, but no known cardiac disease.  She has a 20 pack year smoking history and quit 3 months ago.  She very physically active prior to the LBP worsening.    History is obtained from the patient and  medical record including Care Everywhere.        Past Medical History  Past Medical History:   Diagnosis Date     Arthritis      Hypertension      PONV (postoperative nausea and vomiting)         Past Surgical History  Past Surgical History:   Procedure Laterality Date     ANKLE SURGERY       C BREAST AUGMENTATION       ROTATOR CUFF REPAIR RT/LT         Hx of Blood transfusions/reactions: No transfusion history       Personal or FH with difficulty with Anesthesia:  PONV    Prior to Admission Medications  Current Outpatient Prescriptions   Medication Sig Dispense Refill     Acetaminophen (TYLENOL PO) Take 1,000 mg by mouth every 6 hours as  needed for mild pain or fever       hydrochlorothiazide (HYDRODIURIL) 25 MG tablet Take 25 mg by mouth every morning        losartan (COZAAR) 50 MG tablet Take 50 mg by mouth every morning        metoprolol tartrate (LOPRESSOR) 50 MG tablet Take 50 mg by mouth 2 times daily       multivitamin, therapeutic with minerals (MULTI-VITAMIN) TABS tablet Take 1 tablet by mouth every morning       VITAMIN D, CHOLECALCIFEROL, PO Take 1,000 Units by mouth every morning           Allergies  Amlodipine and Lisinopril     Social History  Social History     Social History     Marital status:      Spouse name: N/A     Number of children: N/A     Years of education: N/A     Occupational History     Not on file.     Social History Main Topics     Smoking status: Former Smoker     Packs/day: 1.00     Years: 20.00     Quit date: 4/16/2018     Smokeless tobacco: Never Used     Alcohol use 5.4 oz/week     9 Glasses of wine per week     Drug use: No     Sexual activity: No     Other Topics Concern     Not on file     Social History Narrative          Family History  No family history of bleeding, clotting disorders or complications with anesthesia.    ROS/MED HX     ENT/Pulmonary:     (+)allergic rhinitis, , . .    Neurologic:  - neg neurologic ROS     Cardiovascular: Comment: PAC    (+) hypertension-range: 130's/80's usual range, ---. : . . . :. . Previous cardiac testing date:results:date: results:ECG reviewed date:1/3/18 results:NSR date: results:        METS/Exercise Tolerance: Comment: Reduced due to LBP 3 - Able to walk 1-2 blocks without stopping   Hematologic:  - neg hematologic  ROS     Musculoskeletal:   (+) , , other musculoskeletal (chronic LBP)-     GI/Hepatic:  - neg GI/hepatic ROS     Renal/Genitourinary:  - ROS Renal section negative     Endo:  - neg endo ROS     Psychiatric:  - neg psychiatric ROS     Infectious Disease:  - neg infectious disease ROS     Malignancy:      - no malignancy   Other:    (+) H/O Chronic  "Pain,         Cardiology Tests: (personally reviewed):   Review Results Below in A/P    Labs: (personally reviewed):  Lab Results   Component Value Date    WBC 5.7 07/16/2018    HGB 13.5 07/16/2018    HCT 40.9 07/16/2018     07/16/2018    INR 0.90 07/16/2018    PTT 27 07/16/2018     07/16/2018    POTASSIUM 2.8 (L) 07/16/2018    ALBERTO 9.4 07/16/2018    GLC 90 07/16/2018    CR 0.84 07/16/2018    BUN 10 07/16/2018    CO2 33 (H) 07/16/2018          Physical Exam:  No LMP recorded.   Vital signs:  BP (!) 164/103  Pulse 56  Temp 98.4  F (36.9  C) (Oral)  Resp 18  Ht 1.549 m (5' 1\")  Wt 67.2 kg (148 lb 1.6 oz)  SpO2 99%  BMI 27.98 kg/m2    Constitutional: Awake, alert, cooperative, no apparent distress, and appears stated age.  Eyes: Pupils equal, round and reactive to light, sclera clear, conjunctiva normal.  HENT: Normocephalic, oral pharynx with moist mucus membranes. No goiter appreciated.   Respiratory: Clear to auscultation bilaterally, no crackles or wheezing.  Cardiovascular: Regular rate and rhythm and no overt murmur noted.  No carotid bruits auscultated. No edema. Palpable pulses to radial  DP and PT arteries.   GI: Normal bowel sounds, soft, non-distended, non-tender, no masses palpated  Skin: Warm and dry.  No rashes at anticipated surgical site.   Musculoskeletal: Full extension of the neck.  No redness, warmth, or swelling of exposed joints noted. Gross motor strength is normal.    Neurologic: Awake, alert, oriented to name, place and time.  Gait is normal.   Neuropsychiatric: Calm, cooperative. Normal affect.     Assessment/Plan  Kate Hartman is a 62 year old female who presents for pre-operative H & P in preparation for an O-Arm/Stealth Assisted Lumbar 4-5 Decompression And Bilateral Transforaminal Interbody Fusion With Rodriguez-Cespedes Osteotomies, Autograft, Possible Allograft, Neuromonitoring Due To Cervical Spine Stenosis  On 7/24/18 with Dr. Fartun Ch at the [facility].    PAC referral for risk assessment and optimization for anesthesia with comorbid conditions of:    Pre-operative considerations:  1.  Cardiac:  Functional status METS =3(reduced due to back pain)    Risk of Major Adverse Cardiac event: 0.4%  -HTN , taking HCTZ(hold AM DOS), losartan(hold AM DOS), metoprolol (cont DOS)   *Patient's BP today in clinic 180/101, generally runs in the 130's/80's, Potassium also decreased to 2.8 when normal 6 months ago.  Called her PCP who will see patient prior to surgery for evaluation and treatment.  2.  Pulm:   PAO risk:  low  -Former smoker, 20 pack years, quit 3 months ago, no known pulmonary disease    3.  GI:  PONV    Discussed the above A/P with Dr. Mendoza.  Patient is optimized and is an acceptable candidate for the proposed procedure.  No further diagnostic evaluation is needed.      AVS given to patient regarding medication instructions,  surgery time/arrival time and NPO status.  Jojo Thomas MS PA-C   Preoperative Assessment Center  [facility]  Phone: [phone]  Fax: [phone]

## 2018-07-16 NOTE — ANESTHESIA PREPROCEDURE EVALUATION
Anesthesia Evaluation     . Pt has had prior anesthetic. Type: General and MAC    History of anesthetic complications   - PONV        ROS/MED HX    ENT/Pulmonary:     (+)allergic rhinitis, , . .    Neurologic:  - neg neurologic ROS     Cardiovascular: Comment: PAC    (+) hypertension-range: 130's/80's usual range, ---. : . . . :. . Previous cardiac testing date:results:date: results:ECG reviewed date:1/3/18 results:NSR date: results:          METS/Exercise Tolerance: Comment: Reduced due to LBP 3 - Able to walk 1-2 blocks without stopping   Hematologic:  - neg hematologic  ROS       Musculoskeletal:   (+) , , other musculoskeletal (chronic LBP)-       GI/Hepatic:  - neg GI/hepatic ROS       Renal/Genitourinary:  - ROS Renal section negative       Endo:  - neg endo ROS       Psychiatric:  - neg psychiatric ROS       Infectious Disease:  - neg infectious disease ROS       Malignancy:      - no malignancy   Other:    (+) H/O Chronic Pain,                   Physical Exam  Normal systems: cardiovascular    Airway   Mallampati: III  TM distance: >3 FB  Neck ROM: full    Dental   (+) caps    Cardiovascular   Rhythm and rate: regular and normal      Pulmonary    breath sounds clear to auscultation(+) decreased breath sounds                  PAC Discussion and Assessment    ASA Classification: 3  Case is suitable for: [place]  Anesthetic techniques and relevant risks discussed: GA  Invasive monitoring and risk discussed:   Types:   Possibility and Risk of blood transfusion discussed:   NPO instructions given:   Additional anesthetic preparation and risks discussed:   Needs early admission to pre-op area:   Other:     PAC Resident/NP Anesthesia Assessment:  Kate Hartman is a 62 year old female who presents for pre-operative H & P in preparation for an O-Arm/Stealth Assisted Lumbar 4-5 Decompression And Bilateral Transforaminal Interbody Fusion With Rodriguez-Cespedes Osteotomies, Autograft, Possible Allograft,  Neuromonitoring Due To Cervical Spine Stenosis  On 7/24/18 with Dr. Fartun Ch at the [facility].    PAC referral for risk assessment and optimization for anesthesia with comorbid conditions of:    Pre-operative considerations:  1.  Cardiac:  Functional status METS =3(reduced due to back pain)    Risk of Major Adverse Cardiac event: 0.4%  -HTN , taking HCTZ(hold AM DOS), losartan(hold AM DOS), metoprolol (cont DOS)   *Patient's BP today in clinic 180/101, generally runs in the 130's/80's, Potassium also decreased to 2.8 when normal 6 months ago.  Called her PCP who will see patient prior to surgery for evaluation and treatment.  2.  Pulm:   PAO risk:  low  -Former smoker, 20 pack years, quit 3 months ago, no known pulmonary disease    3.  GI:  PONV    Discussed the above A/P with Dr. Mendoza.  Patient is optimized and is an acceptable candidate for the proposed procedure.  No further diagnostic evaluation is needed.       Mid-Level Provider/Resident:   Date:   Time:     Attending Anesthesiologist Anesthesia Assessment:  62 year old for stealth assisted L4/5 fusion. Patient has no significant cardiac or pulmonary disease, but HTN that today is not well controlled AND with K of 2.8. We have asked her to see her PCP for reassessment of HTN and better control. Otherwise no significant medical issues.    Patient/case discussed with ANTOINETTE. No need to see patient. Patient is appropriate for the planned procedure without further work-up or medical management.      Reviewed and Signed by PAC Anesthesiologist  Anesthesiologist: bear  Date: 7/16/2018  Time:   Pass/Fail: Pass  Disposition:     PAC Pharmacist Assessment:        Pharmacist:   Date:   Time:      Anesthesia Plan      History & Physical Review  History and physical reviewed and following examination; no interval change.    ASA Status:  3 .    NPO Status:  > 2 hours    Plan for General and ETT with Intravenous induction.  Maintenance will be Balanced.    PONV prophylaxis:  Ondansetron (or other 5HT-3), Scopolamine patch and Dexamethasone or Solumedrol  Additional equipment: Arterial Line and 2nd IV      Postoperative Care  Postoperative pain management:  Multi-modal analgesia.      Consents  Anesthetic plan, risks, benefits and alternatives discussed with:  Patient.  Use of blood products discussed: Yes.   Use of blood products discussed with Patient.  Consented to blood products.  .                          .

## 2018-07-16 NOTE — PATIENT INSTRUCTIONS
Preparing for Your Surgery      Name:  Kate Hartman   MRN:  [id number]   :  1955   Today's Date:  2018     Arriving for surgery:  Surgery date:  2018  Arrival time:  6:00 AM  Please come to:       [facility] [address]  [address] [place]  [address]    -   parking is available in front of the hospital from 5:15 am to 8:00 pm    -  Stop at the Information Desk in the lobby    -   Inform the information person that you are here for surgery. An escort to 3c will be provided. If you would not like an escort, please proceed to 3C on the 3rd floor. [phone]     What can I eat or drink?  -  You may have solid food or milk products until 8 hours prior to your surgery. 12 Midnight  -  You may have water, apple juice or 7up/Sprite until 2 hours prior to your surgery. 6:00 AM      -   Nothing after 6:00 AM    Which medicines can I take?  Stop Aspirin, vitamins and supplements one week prior to surgery.  Hold Ibuprofen and Naproxen for 24 hours prior to surgery.   -  Do NOT take these medications in the morning, the day of surgery:     Hydrochlorothiazide          Losartan  -  Please take these medications the day of surgery:     Metoprolol    How do I prepare myself?  -  Take two showers: one the night before surgery; and one the morning of surgery.         Use Scrubcare or Hibiclens to wash from neck down.  You may use your own     shampoo and conditioner. No other hair products.   -  Do NOT use lotion, powder, deodorant, or antiperspirant the day of your surgery.  -  Do NOT wear any makeup, fingernail polish or jewelry.  -  Begin using Incentive Spirometer 1 week prior to surgery.  Use 4 times per day, up    to 5 breaths each time.  Bring Incentive Spirometer to hospital.  - Do not bring your own medications to the hospital, except for inhalers and eye   drops.  -  Bring your ID and insurance card.    Questions or Concerns:  -If you  have questions or concerns regarding the day of surgery, please call [phone].    -For questions after surgery please call your surgeons office.           AFTER YOUR SURGERY  Breathing exercises   Breathing exercises help you recover faster. Take deep breaths and let the air out slowly. This will:     Help you wake up after surgery.    Help prevent complications like pneumonia.  Preventing complications will help you go home sooner.   We may give you a breathing device (incentive spirometer) to encourage you to breathe deeply.   Nausea and vomiting   You may feel sick to your stomach after surgery; if so, let your nurse know.    Pain control:  After surgery, you may have pain. Our goal is to help you manage your pain. Pain medicine will help you feel comfortable enough to do activities that will help you heal.  These activities may include breathing exercises, walking and physical therapy.   To help your health care team treat your pain we will ask: 1) If you have pain  2) where it is located 3) describe your pain in your words  Methods of pain control include medications given by mouth, vein or by nerve block for some surgeries.  We may give you a pain control pump that will:  1) Deliver the medicine through a tube placed in your vein  2) Control the amount of medicine you receive  3) Allow you to push a button to deliver a dose of pain medicine  Sequential Compression Device (SCD) or Pneumo Boots:  You may need to wear SCD S on your legs or feet. These are wraps connected to a machine that pumps in air and releases it. The repeated pumping helps prevent blood clots from forming.   Using an Incentive Spirometer    An incentive spirometer is a device that helps you do deep breathing exercises. These exercises expand your lungs, aid in circulation, and help prevent pneumonia. Deep breathing exercises also help you breathe better and improve the function of your lungs by:    Keeping your lungs  clear    Strengthening your breathing muscles    Helping prevent respiratory complications or problems  The incentive spirometer gives you a way to take an active part in your care. A nurse or therapist will teach you breathing exercises. To do these exercises, you will breathe in through your mouth and not your nose. The incentive spirometer only works correctly if you breathe in through your mouth.    Steps to clear lungs  Step 1. Exhale normally. Then, inhale normally.    Relax and breathe out.  Step 2. Place your lips tightly around the mouthpiece.    Make sure the device is upright and not tilted.  Step 3. Inhale as much air as you can through the mouthpiece (don't breath through your nose).    Inhale slowly and deeply.    Hold your breath long enough to keep the balls or disk raised for at least 3 to 5 seconds, or as instructed by your healthcare provider.  Step 4. Repeat the exercise regularly.             Begin using the Incentive Spirometer one week prior to your surgery, 4 times per day-5 times each.

## 2018-07-16 NOTE — Clinical Note
Saw this patient had potassium of 2.8.  Called PCP who will see patient this week to adjust hypertension medications. Jojo Thomas MS PA-C PAC

## 2018-07-16 NOTE — MR AVS SNAPSHOT
After Visit Summary   2018    Kate Hartman    MRN: [id number]           Patient Information     Date Of Birth          1955        Visit Information        Provider Department      2018 10:30 AM Rn, [facility]        Care Instructions    Preparing for Your Surgery      Name:  Kate Hartman   MRN:  [id number]   :  1955   Today's Date:  2018     Arriving for surgery:  Surgery date:  2018  Arrival time:  6:00 AM  Please come to:       [facility] [address]  [address] [place]  [address]    -   parking is available in front of the hospital from 5:15 am to 8:00 pm    -  Stop at the Information Desk in the lobby    -   Inform the information person that you are here for surgery. An escort to 3c will be provided. If you would not like an escort, please proceed to 3C on the 3rd floor. [phone]     What can I eat or drink?  -  You may have solid food or milk products until 8 hours prior to your surgery. 12 Midnight  -  You may have water, apple juice or 7up/Sprite until 2 hours prior to your surgery. 6:00 AM      -   Nothing after 6:00 AM    Which medicines can I take?  Stop Aspirin, vitamins and supplements one week prior to surgery.  Hold Ibuprofen and Naproxen for 24 hours prior to surgery.   -  Do NOT take these medications in the morning, the day of surgery:     Hydrochlorothiazide          Losartan  -  Please take these medications the day of surgery:     Metoprolol    How do I prepare myself?  -  Take two showers: one the night before surgery; and one the morning of surgery.         Use Scrubcare or Hibiclens to wash from neck down.  You may use your own     shampoo and conditioner. No other hair products.   -  Do NOT use lotion, powder, deodorant, or antiperspirant the day of your surgery.  -  Do NOT wear any makeup, fingernail polish or jewelry.  -  Begin  using Incentive Spirometer 1 week prior to surgery.  Use 4 times per day, up    to 5 breaths each time.  Bring Incentive Spirometer to hospital.  - Do not bring your own medications to the hospital, except for inhalers and eye   drops.  -  Bring your ID and insurance card.    Questions or Concerns:  -If you have questions or concerns regarding the day of surgery, please call [phone].    -For questions after surgery please call your surgeons office.           AFTER YOUR SURGERY  Breathing exercises   Breathing exercises help you recover faster. Take deep breaths and let the air out slowly. This will:     Help you wake up after surgery.    Help prevent complications like pneumonia.  Preventing complications will help you go home sooner.   We may give you a breathing device (incentive spirometer) to encourage you to breathe deeply.   Nausea and vomiting   You may feel sick to your stomach after surgery; if so, let your nurse know.    Pain control:  After surgery, you may have pain. Our goal is to help you manage your pain. Pain medicine will help you feel comfortable enough to do activities that will help you heal.  These activities may include breathing exercises, walking and physical therapy.   To help your health care team treat your pain we will ask: 1) If you have pain  2) where it is located 3) describe your pain in your words  Methods of pain control include medications given by mouth, vein or by nerve block for some surgeries.  We may give you a pain control pump that will:  1) Deliver the medicine through a tube placed in your vein  2) Control the amount of medicine you receive  3) Allow you to push a button to deliver a dose of pain medicine  Sequential Compression Device (SCD) or Pneumo Boots:  You may need to wear SCD S on your legs or feet. These are wraps connected to a machine that pumps in air and releases it. The repeated pumping helps prevent blood clots from forming.   Using an Incentive  Spirometer    An incentive spirometer is a device that helps you do deep breathing exercises. These exercises expand your lungs, aid in circulation, and help prevent pneumonia. Deep breathing exercises also help you breathe better and improve the function of your lungs by:    Keeping your lungs clear    Strengthening your breathing muscles    Helping prevent respiratory complications or problems  The incentive spirometer gives you a way to take an active part in your care. A nurse or therapist will teach you breathing exercises. To do these exercises, you will breathe in through your mouth and not your nose. The incentive spirometer only works correctly if you breathe in through your mouth.    Steps to clear lungs  Step 1. Exhale normally. Then, inhale normally.    Relax and breathe out.  Step 2. Place your lips tightly around the mouthpiece.    Make sure the device is upright and not tilted.  Step 3. Inhale as much air as you can through the mouthpiece (don't breath through your nose).    Inhale slowly and deeply.    Hold your breath long enough to keep the balls or disk raised for at least 3 to 5 seconds, or as instructed by your healthcare provider.  Step 4. Repeat the exercise regularly.             Begin using the Incentive Spirometer one week prior to your surgery, 4 times per day-5 times each.          Follow-ups after your visit        Your next 10 appointments already scheduled     Jul 16, 2018 10:30 AM CDT   (Arrive by 10:15 AM)   PAC RN ASSESSMENT with Promise Pac Rn   [facility] Preoperative Assessment [place] ([facility])    [address] [place] [address]   [phone]            Jul 16, 2018 11:00 AM CDT   (Arrive by 10:45 AM)   PAC Anesthesia Consult with Promise Pac Anesthesiologist   [facility] Preoperative Assessment [place] ([facility])    [address] [place] [id number]   [phone]            Jul 24, 2018    Procedure with Fartun Ch MD   [facility], [place], Same Day Surgery (--)    [address]  [facility] [id number] [phone]            Aug 07, 2018  1:30 PM CDT   (Arrive by 1:15 PM)   Return Visit with Joya Carvalho PA-C   [facility] ([facility] [place])    [address]  3rd Floor  [facility] [address] [phone]              Who to contact     Please call your clinic at [phone] to:    Ask questions about your health    Make or cancel appointments    Discuss your medicines    Learn about your test results    Speak to your doctor            Additional Information About Your Visit        [employer] Information     [employer] gives you secure access to your electronic health record. If you see a primary care provider, you can also send messages to your care team and make appointments. If you have questions, please call your primary care clinic.  If you do not have a primary care provider, please call [phone] and they will assist you.      [employer] is an electronic gateway that provides easy, online access to your medical records. With [employer], you can request a clinic appointment, read your test results, renew a prescription or communicate with your care team.     To access your existing account, please contact your [facility] or call [phone] for assistance.        Care EveryWhere ID     This is your Care EveryWhere ID. This could be used by other organizations to access your [place] medical records  [id number]         Blood Pressure from Last 3 Encounters:   07/16/18 (!) 164/103   05/31/18 167/67   05/03/18 122/77    Weight from Last 3 Encounters:   07/16/18 67.2 kg (148 lb 1.6 oz)   05/31/18 64.7 kg (142 lb 11.2 oz)   05/03/18 65.4 kg (144 lb 3.2 oz)              Today, you had the following     No orders found for display       Primary Care Provider    None Specified       No primary provider on file.         Equal Access to Services     [facility] : Hadii aad ku hadjosedebbie Gonzalez, waabeda jacobycameronha, qakasiariky ibrahimtimasol miller. So [facility] [phone].    ATENCIÓN: Si habla español, tiene a burnett disposición servicios gratuitos de asistencia lingüística. Llame al [phone].    We comply with applicable federal civil rights laws and [place] laws. We do not discriminate on the basis of race, color, national origin, age, disability, sex, sexual orientation, or gender identity.            Thank you!     Thank you for choosing [facility]  for your care. Our goal is always to provide you with excellent care. Hearing back from our patients is one way we can continue to improve our services. Please take a few minutes to complete the written survey that you may receive in the mail after your visit with us. Thank you!             Your Updated Medication List - Protect others around you: Learn how to safely use, store and throw away your medicines at www.disposemymeds.org.          This list is accurate as of 7/16/18 10:01 AM.  Always use your most recent med list.                   Brand Name Dispense Instructions for use Diagnosis    hydrochlorothiazide 25 MG tablet    HYDRODIURIL     Take 25 mg by mouth every morning        losartan 50 MG tablet    COZAAR     Take 50 mg by mouth every morning        metoprolol tartrate 50 MG tablet    LOPRESSOR     Take 50 mg by mouth 2 times daily        Multi-vitamin Tabs tablet      Take 1 tablet by mouth every morning        TYLENOL PO      Take 1,000 mg by mouth every 6 hours as needed for mild pain or fever        VITAMIN D (CHOLECALCIFEROL) PO      Take 1,000 Units by mouth every morning

## 2018-07-20 ENCOUNTER — TRANSFERRED RECORDS (OUTPATIENT)
Dept: HEALTH INFORMATION MANAGEMENT | Facility: CLINIC | Age: 63
End: 2018-07-20

## 2018-07-24 ENCOUNTER — SURGERY (OUTPATIENT)
Age: 63
End: 2018-07-24

## 2018-07-24 ENCOUNTER — ANESTHESIA (OUTPATIENT)
Dept: SURGERY | Facility: CLINIC | Age: 63
End: 2018-07-24
Payer: COMMERCIAL

## 2018-07-24 ENCOUNTER — APPOINTMENT (OUTPATIENT)
Dept: GENERAL RADIOLOGY | Facility: CLINIC | Age: 63
End: 2018-07-24
Attending: NEUROLOGICAL SURGERY
Payer: COMMERCIAL

## 2018-07-24 ENCOUNTER — HOSPITAL ENCOUNTER (INPATIENT)
Facility: CLINIC | Age: 63
LOS: 2 days | Discharge: HOME | End: 2018-07-26
Attending: NEUROLOGICAL SURGERY | Admitting: NEUROLOGICAL SURGERY
Payer: COMMERCIAL

## 2018-07-24 DIAGNOSIS — Z98.1 S/P LUMBAR FUSION: Primary | ICD-10-CM

## 2018-07-24 LAB
ABO + RH BLD: NORMAL
ABO + RH BLD: NORMAL
APTT PPP: 30 SEC (ref 22–37)
BLD GP AB SCN SERPL QL: NORMAL
BLOOD BANK CMNT PATIENT-IMP: NORMAL
BLOOD BANK CMNT PATIENT-IMP: NORMAL
CA-I BLD-MCNC: 4.5 MG/DL (ref 4.4–5.2)
ERYTHROCYTE [DISTWIDTH] IN BLOOD BY AUTOMATED COUNT: 12.4 % (ref 10–15)
FIBRINOGEN PPP-MCNC: 274 MG/DL (ref 200–420)
GLUCOSE SERPL-MCNC: 99 MG/DL (ref 70–99)
HCT VFR BLD AUTO: 36.3 % (ref 35–47)
HGB BLD-MCNC: 12.1 G/DL (ref 11.7–15.7)
INR PPP: 1 (ref 0.86–1.14)
MAGNESIUM SERPL-MCNC: 1.4 MG/DL (ref 1.6–2.3)
MCH RBC QN AUTO: 34.7 PG (ref 26.5–33)
MCHC RBC AUTO-ENTMCNC: 33.3 G/DL (ref 31.5–36.5)
MCV RBC AUTO: 104 FL (ref 78–100)
PHOSPHATE SERPL-MCNC: 4 MG/DL (ref 2.5–4.5)
PLATELET # BLD AUTO: 334 10E9/L (ref 150–450)
POTASSIUM SERPL-SCNC: 3.9 MMOL/L (ref 3.4–5.3)
POTASSIUM SERPL-SCNC: 4 MMOL/L (ref 3.4–5.3)
RBC # BLD AUTO: 3.49 10E12/L (ref 3.8–5.2)
SPECIMEN EXP DATE BLD: NORMAL
WBC # BLD AUTO: 12 10E9/L (ref 4–11)

## 2018-07-24 PROCEDURE — 25000128 H RX IP 250 OP 636: Performed by: NEUROLOGICAL SURGERY

## 2018-07-24 PROCEDURE — 25000125 ZZHC RX 250: Performed by: ANESTHESIOLOGY

## 2018-07-24 PROCEDURE — 25000125 ZZHC RX 250: Performed by: NURSE ANESTHETIST, CERTIFIED REGISTERED

## 2018-07-24 PROCEDURE — 37000008 ZZH ANESTHESIA TECHNICAL FEE, 1ST 30 MIN: Performed by: NEUROLOGICAL SURGERY

## 2018-07-24 PROCEDURE — 27810169 ZZH OR IMPLANT GENERAL: Performed by: NEUROLOGICAL SURGERY

## 2018-07-24 PROCEDURE — 27210995 ZZH RX 272: Performed by: NEUROLOGICAL SURGERY

## 2018-07-24 PROCEDURE — 25000128 H RX IP 250 OP 636: Performed by: NURSE ANESTHETIST, CERTIFIED REGISTERED

## 2018-07-24 PROCEDURE — C1762 CONN TISS, HUMAN(INC FASCIA): HCPCS | Performed by: NEUROLOGICAL SURGERY

## 2018-07-24 PROCEDURE — 85027 COMPLETE CBC AUTOMATED: CPT | Performed by: ANESTHESIOLOGY

## 2018-07-24 PROCEDURE — 01NB0ZZ RELEASE LUMBAR NERVE, OPEN APPROACH: ICD-10-PCS | Performed by: NEUROLOGICAL SURGERY

## 2018-07-24 PROCEDURE — 95940 IONM IN OPERATNG ROOM 15 MIN: CPT | Performed by: NEUROLOGICAL SURGERY

## 2018-07-24 PROCEDURE — 25000132 ZZH RX MED GY IP 250 OP 250 PS 637: Performed by: STUDENT IN AN ORGANIZED HEALTH CARE EDUCATION/TRAINING PROGRAM

## 2018-07-24 PROCEDURE — 12000008 ZZH R&B INTERMEDIATE UMMC

## 2018-07-24 PROCEDURE — 85384 FIBRINOGEN ACTIVITY: CPT | Performed by: STUDENT IN AN ORGANIZED HEALTH CARE EDUCATION/TRAINING PROGRAM

## 2018-07-24 PROCEDURE — 0SG0071 FUSION OF LUMBAR VERTEBRAL JOINT WITH AUTOLOGOUS TISSUE SUBSTITUTE, POSTERIOR APPROACH, POSTERIOR COLUMN, OPEN APPROACH: ICD-10-PCS | Performed by: NEUROLOGICAL SURGERY

## 2018-07-24 PROCEDURE — C1713 ANCHOR/SCREW BN/BN,TIS/BN: HCPCS | Performed by: NEUROLOGICAL SURGERY

## 2018-07-24 PROCEDURE — 0ST20ZZ RESECTION OF LUMBAR VERTEBRAL DISC, OPEN APPROACH: ICD-10-PCS | Performed by: NEUROLOGICAL SURGERY

## 2018-07-24 PROCEDURE — 93010 ELECTROCARDIOGRAM REPORT: CPT | Performed by: INTERNAL MEDICINE

## 2018-07-24 PROCEDURE — 85610 PROTHROMBIN TIME: CPT | Performed by: STUDENT IN AN ORGANIZED HEALTH CARE EDUCATION/TRAINING PROGRAM

## 2018-07-24 PROCEDURE — 0SG00AJ FUSION OF LUMBAR VERTEBRAL JOINT WITH INTERBODY FUSION DEVICE, POSTERIOR APPROACH, ANTERIOR COLUMN, OPEN APPROACH: ICD-10-PCS | Performed by: NEUROLOGICAL SURGERY

## 2018-07-24 PROCEDURE — 36415 COLL VENOUS BLD VENIPUNCTURE: CPT | Performed by: ANESTHESIOLOGY

## 2018-07-24 PROCEDURE — 25000128 H RX IP 250 OP 636: Performed by: STUDENT IN AN ORGANIZED HEALTH CARE EDUCATION/TRAINING PROGRAM

## 2018-07-24 PROCEDURE — 83735 ASSAY OF MAGNESIUM: CPT | Performed by: ANESTHESIOLOGY

## 2018-07-24 PROCEDURE — 27211020 ZZHC OR CELL SAVER OPNP: Performed by: NEUROLOGICAL SURGERY

## 2018-07-24 PROCEDURE — 84100 ASSAY OF PHOSPHORUS: CPT | Performed by: ANESTHESIOLOGY

## 2018-07-24 PROCEDURE — 40000065 ZZH STATISTIC EKG NON-CHARGEABLE

## 2018-07-24 PROCEDURE — 27210794 ZZH OR GENERAL SUPPLY STERILE: Performed by: NEUROLOGICAL SURGERY

## 2018-07-24 PROCEDURE — 4A11X4G MONITORING OF PERIPHERAL NERVOUS ELECTRICAL ACTIVITY, INTRAOPERATIVE, EXTERNAL APPROACH: ICD-10-PCS | Performed by: NEUROLOGICAL SURGERY

## 2018-07-24 PROCEDURE — 36000078 ZZH SURGERY LEVEL 6 W FLUORO 1ST 30 MIN - UMMC: Performed by: NEUROLOGICAL SURGERY

## 2018-07-24 PROCEDURE — 27211022 ZZHC OR IOM SUPPLIES OPNP: Performed by: NEUROLOGICAL SURGERY

## 2018-07-24 PROCEDURE — 40000170 ZZH STATISTIC PRE-PROCEDURE ASSESSMENT II: Performed by: NEUROLOGICAL SURGERY

## 2018-07-24 PROCEDURE — 82947 ASSAY GLUCOSE BLOOD QUANT: CPT | Performed by: ANESTHESIOLOGY

## 2018-07-24 PROCEDURE — 71000015 ZZH RECOVERY PHASE 1 LEVEL 2 EA ADDTL HR: Performed by: NEUROLOGICAL SURGERY

## 2018-07-24 PROCEDURE — 84132 ASSAY OF SERUM POTASSIUM: CPT | Performed by: ANESTHESIOLOGY

## 2018-07-24 PROCEDURE — 25000132 ZZH RX MED GY IP 250 OP 250 PS 637: Performed by: ANESTHESIOLOGY

## 2018-07-24 PROCEDURE — 40000275 ZZH STATISTIC RCP TIME EA 10 MIN

## 2018-07-24 PROCEDURE — 25000128 H RX IP 250 OP 636: Performed by: ANESTHESIOLOGY

## 2018-07-24 PROCEDURE — 36000076 ZZH SURGERY LEVEL 6 EA 15 ADDTL MIN - UMMC: Performed by: NEUROLOGICAL SURGERY

## 2018-07-24 PROCEDURE — 71000014 ZZH RECOVERY PHASE 1 LEVEL 2 FIRST HR: Performed by: NEUROLOGICAL SURGERY

## 2018-07-24 PROCEDURE — 85730 THROMBOPLASTIN TIME PARTIAL: CPT | Performed by: STUDENT IN AN ORGANIZED HEALTH CARE EDUCATION/TRAINING PROGRAM

## 2018-07-24 PROCEDURE — 37000009 ZZH ANESTHESIA TECHNICAL FEE, EACH ADDTL 15 MIN: Performed by: NEUROLOGICAL SURGERY

## 2018-07-24 PROCEDURE — 40000277 XR SURGERY CARM FLUORO LESS THAN 5 MIN W STILLS: Mod: TC

## 2018-07-24 PROCEDURE — 8E0WXBF COMPUTER ASSISTED PROCEDURE OF TRUNK REGION, WITH FLUOROSCOPY: ICD-10-PCS | Performed by: NEUROLOGICAL SURGERY

## 2018-07-24 PROCEDURE — 25000566 ZZH SEVOFLURANE, EA 15 MIN: Performed by: NEUROLOGICAL SURGERY

## 2018-07-24 PROCEDURE — 25000125 ZZHC RX 250: Performed by: NEUROLOGICAL SURGERY

## 2018-07-24 PROCEDURE — 82330 ASSAY OF CALCIUM: CPT | Performed by: STUDENT IN AN ORGANIZED HEALTH CARE EDUCATION/TRAINING PROGRAM

## 2018-07-24 PROCEDURE — 40000014 ZZH STATISTIC ARTERIAL MONITORING DAILY

## 2018-07-24 DEVICE — IMP SCR MEDT 5.5/6.0MM SOLERA 6.5X50MM MA 55840006550: Type: IMPLANTABLE DEVICE | Site: SPINE LUMBAR | Status: FUNCTIONAL

## 2018-07-24 DEVICE — GRAFT BONE CRUSH CANC 30ML 400080: Type: IMPLANTABLE DEVICE | Site: SPINE LUMBAR | Status: FUNCTIONAL

## 2018-07-24 DEVICE — IMP ROD MEDT SOLERA CVD 5.5X45MM TI 1553201045: Type: IMPLANTABLE DEVICE | Site: SPINE LUMBAR | Status: FUNCTIONAL

## 2018-07-24 DEVICE — IMP SPACER MEDT CAPSTONE CONTROL 10X22MM 12DEG 4011022: Type: IMPLANTABLE DEVICE | Site: SPINE LUMBAR | Status: FUNCTIONAL

## 2018-07-24 DEVICE — IMP SCR SET MEDT SOLERA BREAK OFF 5.5MM TI 5540030: Type: IMPLANTABLE DEVICE | Site: SPINE LUMBAR | Status: FUNCTIONAL

## 2018-07-24 DEVICE — IMP ROD MEDT SOLERA CVD 5.5X30MM TI 1553201030: Type: IMPLANTABLE DEVICE | Site: SPINE LUMBAR | Status: FUNCTIONAL

## 2018-07-24 DEVICE — IMP SCR MEDT 5.5/6.0MM SOLERA 7.5X40MM MA 55840007540: Type: IMPLANTABLE DEVICE | Site: SPINE LUMBAR | Status: FUNCTIONAL

## 2018-07-24 RX ORDER — ONDANSETRON 2 MG/ML
4 INJECTION INTRAMUSCULAR; INTRAVENOUS EVERY 30 MIN PRN
Status: DISCONTINUED | OUTPATIENT
Start: 2018-07-24 | End: 2018-07-24 | Stop reason: HOSPADM

## 2018-07-24 RX ORDER — ONDANSETRON 4 MG/1
4 TABLET, ORALLY DISINTEGRATING ORAL EVERY 6 HOURS PRN
Status: DISCONTINUED | OUTPATIENT
Start: 2018-07-24 | End: 2018-07-24

## 2018-07-24 RX ORDER — FENTANYL CITRATE 50 UG/ML
INJECTION, SOLUTION INTRAMUSCULAR; INTRAVENOUS PRN
Status: DISCONTINUED | OUTPATIENT
Start: 2018-07-24 | End: 2018-07-24

## 2018-07-24 RX ORDER — POTASSIUM CHLORIDE 750 MG/1
20-40 TABLET, EXTENDED RELEASE ORAL
Status: DISCONTINUED | OUTPATIENT
Start: 2018-07-24 | End: 2018-07-26 | Stop reason: HOSPADM

## 2018-07-24 RX ORDER — CEFAZOLIN SODIUM 2 G/100ML
2 INJECTION, SOLUTION INTRAVENOUS
Status: COMPLETED | OUTPATIENT
Start: 2018-07-24 | End: 2018-07-24

## 2018-07-24 RX ORDER — MULTIPLE VITAMINS W/ MINERALS TAB 9MG-400MCG
1 TAB ORAL EVERY MORNING
Status: DISCONTINUED | OUTPATIENT
Start: 2018-07-25 | End: 2018-07-26 | Stop reason: HOSPADM

## 2018-07-24 RX ORDER — LABETALOL HYDROCHLORIDE 5 MG/ML
10-40 INJECTION, SOLUTION INTRAVENOUS EVERY 10 MIN PRN
Status: DISCONTINUED | OUTPATIENT
Start: 2018-07-24 | End: 2018-07-26 | Stop reason: HOSPADM

## 2018-07-24 RX ORDER — POLYETHYLENE GLYCOL 3350 17 G/17G
17 POWDER, FOR SOLUTION ORAL 3 TIMES DAILY
Status: DISCONTINUED | OUTPATIENT
Start: 2018-07-24 | End: 2018-07-26 | Stop reason: HOSPADM

## 2018-07-24 RX ORDER — POTASSIUM CHLORIDE 7.45 MG/ML
10 INJECTION INTRAVENOUS
Status: DISCONTINUED | OUTPATIENT
Start: 2018-07-24 | End: 2018-07-26 | Stop reason: HOSPADM

## 2018-07-24 RX ORDER — MAGNESIUM SULFATE HEPTAHYDRATE 40 MG/ML
4 INJECTION, SOLUTION INTRAVENOUS EVERY 4 HOURS PRN
Status: DISCONTINUED | OUTPATIENT
Start: 2018-07-24 | End: 2018-07-26 | Stop reason: HOSPADM

## 2018-07-24 RX ORDER — BUPIVACAINE HYDROCHLORIDE AND EPINEPHRINE 5; 5 MG/ML; UG/ML
INJECTION, SOLUTION PERINEURAL PRN
Status: DISCONTINUED | OUTPATIENT
Start: 2018-07-24 | End: 2018-07-24 | Stop reason: HOSPADM

## 2018-07-24 RX ORDER — MINERAL OIL 100 G/100G
1 OIL RECTAL DAILY PRN
Status: DISCONTINUED | OUTPATIENT
Start: 2018-07-24 | End: 2018-07-26 | Stop reason: HOSPADM

## 2018-07-24 RX ORDER — AMOXICILLIN 250 MG
3 CAPSULE ORAL 2 TIMES DAILY
Status: DISCONTINUED | OUTPATIENT
Start: 2018-07-24 | End: 2018-07-26 | Stop reason: HOSPADM

## 2018-07-24 RX ORDER — POTASSIUM CHLORIDE 1.5 G/1.58G
20-40 POWDER, FOR SOLUTION ORAL
Status: DISCONTINUED | OUTPATIENT
Start: 2018-07-24 | End: 2018-07-26 | Stop reason: HOSPADM

## 2018-07-24 RX ORDER — ONDANSETRON 4 MG/1
4 TABLET, ORALLY DISINTEGRATING ORAL EVERY 30 MIN PRN
Status: DISCONTINUED | OUTPATIENT
Start: 2018-07-24 | End: 2018-07-24 | Stop reason: HOSPADM

## 2018-07-24 RX ORDER — VANCOMYCIN HYDROCHLORIDE 1 G/20ML
INJECTION, POWDER, LYOPHILIZED, FOR SOLUTION INTRAVENOUS PRN
Status: DISCONTINUED | OUTPATIENT
Start: 2018-07-24 | End: 2018-07-24 | Stop reason: HOSPADM

## 2018-07-24 RX ORDER — AMOXICILLIN 250 MG
2 CAPSULE ORAL 2 TIMES DAILY
Status: DISCONTINUED | OUTPATIENT
Start: 2018-07-24 | End: 2018-07-24

## 2018-07-24 RX ORDER — LABETALOL HYDROCHLORIDE 5 MG/ML
10 INJECTION, SOLUTION INTRAVENOUS
Status: DISCONTINUED | OUTPATIENT
Start: 2018-07-24 | End: 2018-07-24 | Stop reason: HOSPADM

## 2018-07-24 RX ORDER — ACETAMINOPHEN 325 MG/1
975 TABLET ORAL ONCE
Status: COMPLETED | OUTPATIENT
Start: 2018-07-24 | End: 2018-07-24

## 2018-07-24 RX ORDER — POTASSIUM CHLORIDE 29.8 MG/ML
20 INJECTION INTRAVENOUS
Status: DISCONTINUED | OUTPATIENT
Start: 2018-07-24 | End: 2018-07-24 | Stop reason: RX

## 2018-07-24 RX ORDER — MAGNESIUM SULFATE HEPTAHYDRATE 40 MG/ML
2 INJECTION, SOLUTION INTRAVENOUS DAILY PRN
Status: DISCONTINUED | OUTPATIENT
Start: 2018-07-24 | End: 2018-07-26 | Stop reason: HOSPADM

## 2018-07-24 RX ORDER — FENTANYL CITRATE 50 UG/ML
25-50 INJECTION, SOLUTION INTRAMUSCULAR; INTRAVENOUS
Status: DISCONTINUED | OUTPATIENT
Start: 2018-07-24 | End: 2018-07-24 | Stop reason: HOSPADM

## 2018-07-24 RX ORDER — METOPROLOL TARTRATE 50 MG
50 TABLET ORAL 2 TIMES DAILY
Status: DISCONTINUED | OUTPATIENT
Start: 2018-07-24 | End: 2018-07-26 | Stop reason: HOSPADM

## 2018-07-24 RX ORDER — ONDANSETRON 2 MG/ML
INJECTION INTRAMUSCULAR; INTRAVENOUS PRN
Status: DISCONTINUED | OUTPATIENT
Start: 2018-07-24 | End: 2018-07-24

## 2018-07-24 RX ORDER — SODIUM CHLORIDE 9 MG/ML
INJECTION, SOLUTION INTRAVENOUS CONTINUOUS
Status: ACTIVE | OUTPATIENT
Start: 2018-07-24 | End: 2018-07-25

## 2018-07-24 RX ORDER — LIDOCAINE 40 MG/G
CREAM TOPICAL
Status: DISCONTINUED | OUTPATIENT
Start: 2018-07-24 | End: 2018-07-26 | Stop reason: HOSPADM

## 2018-07-24 RX ORDER — SCOLOPAMINE TRANSDERMAL SYSTEM 1 MG/1
1 PATCH, EXTENDED RELEASE TRANSDERMAL
Status: DISCONTINUED | OUTPATIENT
Start: 2018-07-24 | End: 2018-07-24 | Stop reason: HOSPADM

## 2018-07-24 RX ORDER — SODIUM CHLORIDE, SODIUM LACTATE, POTASSIUM CHLORIDE, CALCIUM CHLORIDE 600; 310; 30; 20 MG/100ML; MG/100ML; MG/100ML; MG/100ML
INJECTION, SOLUTION INTRAVENOUS CONTINUOUS
Status: DISCONTINUED | OUTPATIENT
Start: 2018-07-24 | End: 2018-07-24 | Stop reason: HOSPADM

## 2018-07-24 RX ORDER — ASCORBIC ACID 250 MG
250 TABLET,CHEWABLE ORAL DAILY
Status: DISCONTINUED | OUTPATIENT
Start: 2018-07-24 | End: 2018-07-26 | Stop reason: HOSPADM

## 2018-07-24 RX ORDER — LIDOCAINE HYDROCHLORIDE 20 MG/ML
INJECTION, SOLUTION INFILTRATION; PERINEURAL PRN
Status: DISCONTINUED | OUTPATIENT
Start: 2018-07-24 | End: 2018-07-24

## 2018-07-24 RX ORDER — NALOXONE HYDROCHLORIDE 0.4 MG/ML
.1-.4 INJECTION, SOLUTION INTRAMUSCULAR; INTRAVENOUS; SUBCUTANEOUS
Status: DISCONTINUED | OUTPATIENT
Start: 2018-07-24 | End: 2018-07-26 | Stop reason: HOSPADM

## 2018-07-24 RX ORDER — OXYCODONE HYDROCHLORIDE 5 MG/1
5-10 TABLET ORAL
Status: DISCONTINUED | OUTPATIENT
Start: 2018-07-24 | End: 2018-07-26 | Stop reason: HOSPADM

## 2018-07-24 RX ORDER — CEFAZOLIN SODIUM 1 G/3ML
1 INJECTION, POWDER, FOR SOLUTION INTRAMUSCULAR; INTRAVENOUS SEE ADMIN INSTRUCTIONS
Status: DISCONTINUED | OUTPATIENT
Start: 2018-07-24 | End: 2018-07-24 | Stop reason: HOSPADM

## 2018-07-24 RX ORDER — HYDROMORPHONE HYDROCHLORIDE 1 MG/ML
.3-.5 INJECTION, SOLUTION INTRAMUSCULAR; INTRAVENOUS; SUBCUTANEOUS
Status: DISPENSED | OUTPATIENT
Start: 2018-07-24 | End: 2018-07-25

## 2018-07-24 RX ORDER — HYDRALAZINE HYDROCHLORIDE 20 MG/ML
10-20 INJECTION INTRAMUSCULAR; INTRAVENOUS EVERY 30 MIN PRN
Status: DISCONTINUED | OUTPATIENT
Start: 2018-07-24 | End: 2018-07-26 | Stop reason: HOSPADM

## 2018-07-24 RX ORDER — ACETAMINOPHEN 325 MG/1
975 TABLET ORAL EVERY 8 HOURS
Status: DISCONTINUED | OUTPATIENT
Start: 2018-07-24 | End: 2018-07-26 | Stop reason: HOSPADM

## 2018-07-24 RX ORDER — PROCHLORPERAZINE MALEATE 10 MG
10 TABLET ORAL EVERY 6 HOURS PRN
Status: DISCONTINUED | OUTPATIENT
Start: 2018-07-24 | End: 2018-07-26 | Stop reason: HOSPADM

## 2018-07-24 RX ORDER — HYDROCHLOROTHIAZIDE 25 MG/1
25 TABLET ORAL EVERY MORNING
Status: DISCONTINUED | OUTPATIENT
Start: 2018-07-25 | End: 2018-07-26 | Stop reason: HOSPADM

## 2018-07-24 RX ORDER — MAGNESIUM CARB/ALUMINUM HYDROX 105-160MG
296 TABLET,CHEWABLE ORAL DAILY PRN
Status: DISCONTINUED | OUTPATIENT
Start: 2018-07-24 | End: 2018-07-26 | Stop reason: HOSPADM

## 2018-07-24 RX ORDER — PROPOFOL 10 MG/ML
INJECTION, EMULSION INTRAVENOUS CONTINUOUS PRN
Status: DISCONTINUED | OUTPATIENT
Start: 2018-07-24 | End: 2018-07-24

## 2018-07-24 RX ORDER — AMOXICILLIN 250 MG
1 CAPSULE ORAL 2 TIMES DAILY
Status: DISCONTINUED | OUTPATIENT
Start: 2018-07-24 | End: 2018-07-24

## 2018-07-24 RX ORDER — ACETAMINOPHEN 325 MG/1
650 TABLET ORAL EVERY 4 HOURS PRN
Status: DISCONTINUED | OUTPATIENT
Start: 2018-07-27 | End: 2018-07-26 | Stop reason: HOSPADM

## 2018-07-24 RX ORDER — ONDANSETRON 2 MG/ML
4 INJECTION INTRAMUSCULAR; INTRAVENOUS EVERY 6 HOURS PRN
Status: DISCONTINUED | OUTPATIENT
Start: 2018-07-24 | End: 2018-07-24

## 2018-07-24 RX ORDER — NALOXONE HYDROCHLORIDE 0.4 MG/ML
.1-.4 INJECTION, SOLUTION INTRAMUSCULAR; INTRAVENOUS; SUBCUTANEOUS
Status: DISCONTINUED | OUTPATIENT
Start: 2018-07-24 | End: 2018-07-24

## 2018-07-24 RX ORDER — PROPOFOL 10 MG/ML
INJECTION, EMULSION INTRAVENOUS PRN
Status: DISCONTINUED | OUTPATIENT
Start: 2018-07-24 | End: 2018-07-24

## 2018-07-24 RX ORDER — ZINC SULFATE 50(220)MG
220 CAPSULE ORAL DAILY
Status: DISCONTINUED | OUTPATIENT
Start: 2018-07-24 | End: 2018-07-26 | Stop reason: HOSPADM

## 2018-07-24 RX ORDER — POLYETHYLENE GLYCOL 3350 17 G/17G
17 POWDER, FOR SOLUTION ORAL DAILY
Status: DISCONTINUED | OUTPATIENT
Start: 2018-07-24 | End: 2018-07-24

## 2018-07-24 RX ORDER — BISACODYL 10 MG
10 SUPPOSITORY, RECTAL RECTAL DAILY
Status: DISCONTINUED | OUTPATIENT
Start: 2018-07-25 | End: 2018-07-26 | Stop reason: HOSPADM

## 2018-07-24 RX ORDER — LIDOCAINE 4 G/G
1 PATCH TOPICAL
Status: DISCONTINUED | OUTPATIENT
Start: 2018-07-24 | End: 2018-07-26 | Stop reason: HOSPADM

## 2018-07-24 RX ORDER — EPHEDRINE SULFATE 50 MG/ML
INJECTION, SOLUTION INTRAMUSCULAR; INTRAVENOUS; SUBCUTANEOUS PRN
Status: DISCONTINUED | OUTPATIENT
Start: 2018-07-24 | End: 2018-07-24

## 2018-07-24 RX ORDER — POTASSIUM CHLORIDE 7.45 MG/ML
10 INJECTION INTRAVENOUS ONCE
Status: COMPLETED | OUTPATIENT
Start: 2018-07-24 | End: 2018-07-24

## 2018-07-24 RX ORDER — LOSARTAN POTASSIUM 50 MG/1
50 TABLET ORAL EVERY MORNING
Status: DISCONTINUED | OUTPATIENT
Start: 2018-07-25 | End: 2018-07-26 | Stop reason: HOSPADM

## 2018-07-24 RX ORDER — POTASSIUM CL/LIDO/0.9 % NACL 10MEQ/0.1L
10 INTRAVENOUS SOLUTION, PIGGYBACK (ML) INTRAVENOUS
Status: DISCONTINUED | OUTPATIENT
Start: 2018-07-24 | End: 2018-07-26 | Stop reason: HOSPADM

## 2018-07-24 RX ORDER — LIDOCAINE 40 MG/G
CREAM TOPICAL
Status: DISCONTINUED | OUTPATIENT
Start: 2018-07-24 | End: 2018-07-24 | Stop reason: HOSPADM

## 2018-07-24 RX ORDER — ONDANSETRON 2 MG/ML
4-8 INJECTION INTRAMUSCULAR; INTRAVENOUS EVERY 6 HOURS PRN
Status: DISCONTINUED | OUTPATIENT
Start: 2018-07-24 | End: 2018-07-26 | Stop reason: HOSPADM

## 2018-07-24 RX ORDER — ONDANSETRON 4 MG/1
4-8 TABLET, ORALLY DISINTEGRATING ORAL EVERY 6 HOURS PRN
Status: DISCONTINUED | OUTPATIENT
Start: 2018-07-24 | End: 2018-07-26 | Stop reason: HOSPADM

## 2018-07-24 RX ORDER — HYDROMORPHONE HYDROCHLORIDE 1 MG/ML
.3-.5 INJECTION, SOLUTION INTRAMUSCULAR; INTRAVENOUS; SUBCUTANEOUS EVERY 5 MIN PRN
Status: DISCONTINUED | OUTPATIENT
Start: 2018-07-24 | End: 2018-07-24 | Stop reason: HOSPADM

## 2018-07-24 RX ADMIN — PHENYLEPHRINE HYDROCHLORIDE 100 MCG: 10 INJECTION, SOLUTION INTRAMUSCULAR; INTRAVENOUS; SUBCUTANEOUS at 10:06

## 2018-07-24 RX ADMIN — Medication 5 MG: at 08:31

## 2018-07-24 RX ADMIN — Medication 5 MG: at 08:58

## 2018-07-24 RX ADMIN — Medication 1 EACH: at 10:06

## 2018-07-24 RX ADMIN — POLYETHYLENE GLYCOL 3350 17 G: 17 POWDER, FOR SOLUTION ORAL at 17:44

## 2018-07-24 RX ADMIN — Medication 5 MG: at 09:15

## 2018-07-24 RX ADMIN — OXYCODONE HYDROCHLORIDE 5 MG: 5 TABLET ORAL at 20:40

## 2018-07-24 RX ADMIN — Medication 5 MG: at 09:38

## 2018-07-24 RX ADMIN — LIDOCAINE 1 PATCH: 560 PATCH PERCUTANEOUS; TOPICAL; TRANSDERMAL at 21:00

## 2018-07-24 RX ADMIN — Medication 0.3 MG: at 14:37

## 2018-07-24 RX ADMIN — SCOPALAMINE 1 PATCH: 1 PATCH, EXTENDED RELEASE TRANSDERMAL at 07:29

## 2018-07-24 RX ADMIN — FENTANYL CITRATE 50 MCG: 50 INJECTION, SOLUTION INTRAMUSCULAR; INTRAVENOUS at 13:24

## 2018-07-24 RX ADMIN — PHENYLEPHRINE HYDROCHLORIDE 100 MCG: 10 INJECTION, SOLUTION INTRAMUSCULAR; INTRAVENOUS; SUBCUTANEOUS at 08:15

## 2018-07-24 RX ADMIN — ACETAMINOPHEN 975 MG: 325 TABLET, FILM COATED ORAL at 07:29

## 2018-07-24 RX ADMIN — FENTANYL CITRATE 150 MCG: 50 INJECTION, SOLUTION INTRAMUSCULAR; INTRAVENOUS at 08:08

## 2018-07-24 RX ADMIN — MAGNESIUM SULFATE IN WATER 4 G: 40 INJECTION, SOLUTION INTRAVENOUS at 20:41

## 2018-07-24 RX ADMIN — Medication 0.4 MG: at 14:50

## 2018-07-24 RX ADMIN — SODIUM CHLORIDE: 9 INJECTION, SOLUTION INTRAVENOUS at 14:30

## 2018-07-24 RX ADMIN — ONDANSETRON 4 MG: 2 INJECTION INTRAMUSCULAR; INTRAVENOUS at 12:24

## 2018-07-24 RX ADMIN — POLYETHYLENE GLYCOL 3350 17 G: 17 POWDER, FOR SOLUTION ORAL at 20:40

## 2018-07-24 RX ADMIN — PROPOFOL 50 MCG/KG/MIN: 10 INJECTION, EMULSION INTRAVENOUS at 08:45

## 2018-07-24 RX ADMIN — OXYCODONE HYDROCHLORIDE 5 MG: 5 TABLET ORAL at 22:37

## 2018-07-24 RX ADMIN — PROPOFOL 120 MG: 10 INJECTION, EMULSION INTRAVENOUS at 08:08

## 2018-07-24 RX ADMIN — SODIUM CHLORIDE, POTASSIUM CHLORIDE, SODIUM LACTATE AND CALCIUM CHLORIDE: 600; 310; 30; 20 INJECTION, SOLUTION INTRAVENOUS at 11:26

## 2018-07-24 RX ADMIN — FENTANYL CITRATE 50 MCG: 50 INJECTION, SOLUTION INTRAMUSCULAR; INTRAVENOUS at 09:03

## 2018-07-24 RX ADMIN — MAGNESIUM HYDROXIDE 30 ML: 400 SUSPENSION ORAL at 17:43

## 2018-07-24 RX ADMIN — POLYMYXIN B SULFATE 2000 ML: 500000 INJECTION, POWDER, LYOPHILIZED, FOR SOLUTION INTRAMUSCULAR; INTRATHECAL; INTRAVENOUS; OPHTHALMIC at 12:19

## 2018-07-24 RX ADMIN — PROPOFOL 40 MG: 10 INJECTION, EMULSION INTRAVENOUS at 08:25

## 2018-07-24 RX ADMIN — Medication 1 KIT: at 09:56

## 2018-07-24 RX ADMIN — SENNOSIDES AND DOCUSATE SODIUM 3 TABLET: 8.6; 5 TABLET ORAL at 20:41

## 2018-07-24 RX ADMIN — FENTANYL CITRATE 50 MCG: 50 INJECTION INTRAMUSCULAR; INTRAVENOUS at 14:09

## 2018-07-24 RX ADMIN — PHENYLEPHRINE HYDROCHLORIDE 100 MCG: 10 INJECTION, SOLUTION INTRAMUSCULAR; INTRAVENOUS; SUBCUTANEOUS at 08:28

## 2018-07-24 RX ADMIN — METOPROLOL TARTRATE 50 MG: 50 TABLET ORAL at 20:40

## 2018-07-24 RX ADMIN — Medication 5 MG: at 08:25

## 2018-07-24 RX ADMIN — VANCOMYCIN HYDROCHLORIDE 1 G: 1 INJECTION, POWDER, LYOPHILIZED, FOR SOLUTION INTRAVENOUS at 12:22

## 2018-07-24 RX ADMIN — Medication 5 MG: at 09:46

## 2018-07-24 RX ADMIN — BUPIVACAINE HYDROCHLORIDE AND EPINEPHRINE BITARTRATE 10 ML: 5; .005 INJECTION, SOLUTION EPIDURAL; INTRACAUDAL; PERINEURAL at 09:54

## 2018-07-24 RX ADMIN — Medication 220 MG: at 17:43

## 2018-07-24 RX ADMIN — REMIFENTANIL HYDROCHLORIDE 0.1 MCG/KG/MIN: 1 INJECTION, POWDER, LYOPHILIZED, FOR SOLUTION INTRAVENOUS at 08:42

## 2018-07-24 RX ADMIN — Medication 10000 UNITS: at 17:44

## 2018-07-24 RX ADMIN — SODIUM CHLORIDE, POTASSIUM CHLORIDE, SODIUM LACTATE AND CALCIUM CHLORIDE: 600; 310; 30; 20 INJECTION, SOLUTION INTRAVENOUS at 08:49

## 2018-07-24 RX ADMIN — ACETAMINOPHEN 975 MG: 325 TABLET, FILM COATED ORAL at 17:56

## 2018-07-24 RX ADMIN — PHENYLEPHRINE HYDROCHLORIDE 100 MCG: 10 INJECTION, SOLUTION INTRAMUSCULAR; INTRAVENOUS; SUBCUTANEOUS at 09:24

## 2018-07-24 RX ADMIN — Medication 80 MG: at 08:10

## 2018-07-24 RX ADMIN — CEFAZOLIN SODIUM 2 G: 2 INJECTION, SOLUTION INTRAVENOUS at 08:45

## 2018-07-24 RX ADMIN — SODIUM CHLORIDE 1 G: 9 INJECTION, SOLUTION INTRAVENOUS at 09:00

## 2018-07-24 RX ADMIN — FENTANYL CITRATE 50 MCG: 50 INJECTION INTRAMUSCULAR; INTRAVENOUS at 13:40

## 2018-07-24 RX ADMIN — CEFAZOLIN 1 G: 1 INJECTION, POWDER, FOR SOLUTION INTRAMUSCULAR; INTRAVENOUS at 10:45

## 2018-07-24 RX ADMIN — HYDROMORPHONE HYDROCHLORIDE 0.5 MG: 1 INJECTION, SOLUTION INTRAMUSCULAR; INTRAVENOUS; SUBCUTANEOUS at 12:31

## 2018-07-24 RX ADMIN — Medication 250 MG: at 17:44

## 2018-07-24 RX ADMIN — MAGNESIUM SULFATE HEPTAHYDRATE 2 G: 40 INJECTION, SOLUTION INTRAVENOUS at 14:21

## 2018-07-24 RX ADMIN — PHENYLEPHRINE HYDROCHLORIDE 100 MCG: 10 INJECTION, SOLUTION INTRAMUSCULAR; INTRAVENOUS; SUBCUTANEOUS at 10:15

## 2018-07-24 RX ADMIN — LIDOCAINE HYDROCHLORIDE 100 MG: 20 INJECTION, SOLUTION INFILTRATION; PERINEURAL at 08:08

## 2018-07-24 RX ADMIN — HYDROMORPHONE HYDROCHLORIDE 0.5 MG: 1 INJECTION, SOLUTION INTRAMUSCULAR; INTRAVENOUS; SUBCUTANEOUS at 12:48

## 2018-07-24 RX ADMIN — MIDAZOLAM 2 MG: 1 INJECTION INTRAMUSCULAR; INTRAVENOUS at 07:58

## 2018-07-24 RX ADMIN — VITAMIN E CAP 100 UNIT 100 UNITS: 100 CAP at 17:44

## 2018-07-24 RX ADMIN — CEFAZOLIN SODIUM 1 G: 2 INJECTION, SOLUTION INTRAVENOUS at 12:45

## 2018-07-24 RX ADMIN — SODIUM CHLORIDE, POTASSIUM CHLORIDE, SODIUM LACTATE AND CALCIUM CHLORIDE: 600; 310; 30; 20 INJECTION, SOLUTION INTRAVENOUS at 07:47

## 2018-07-24 RX ADMIN — Medication 0.5 MG: at 15:15

## 2018-07-24 RX ADMIN — SODIUM CHLORIDE 1 MG/KG/HR: 9 INJECTION, SOLUTION INTRAVENOUS at 09:12

## 2018-07-24 RX ADMIN — POTASSIUM CHLORIDE 10 MEQ: 7.46 INJECTION, SOLUTION INTRAVENOUS at 14:33

## 2018-07-24 RX ADMIN — THROMBIN, TOPICAL (BOVINE) 5000 UNITS: KIT at 10:13

## 2018-07-24 ASSESSMENT — ACTIVITIES OF DAILY LIVING (ADL)
ADLS_ACUITY_SCORE: 11
ADLS_ACUITY_SCORE: 12

## 2018-07-24 NOTE — ANESTHESIA CARE TRANSFER NOTE
Patient: Kate Hartman    Procedure(s):  O-Arm/Stealth Assisted Lumbar 4-5 Decompression And Bilateral Transforaminal Interbody Fusion With Rodriguez-Cespedes Osteotomies, Autograft,  Allograft, Neuromonitoring Due To Cervical Spine Stenosis - Wound Class: I-Clean    Diagnosis: Spondylolisthesis At Lumbar 4-5 Level   Diagnosis Additional Information: No value filed.    Anesthesia Type:   General, ETT     Note:  Airway :Face Mask  Patient transferred to:PACU  Comments:   -on 6L O2 via FM, Pt Spont.  breathing, awake & alert, monitors placed,VSS,  RN at bedside, no airway      Vitals: (Last set prior to Anesthesia Care Transfer)    CRNA VITALS  7/24/2018 1249 - 7/24/2018 1324      7/24/2018             Pulse: 115    ART BP: 142/83    ART Mean: 106    SpO2: 99 %    Resp Rate (observed): 14                Electronically Signed By: YANIQUE Benitez CRNA  July 24, 2018  1:24 PM

## 2018-07-24 NOTE — OR NURSING
Pt going between SR with frequent PACs, HR 65-90 and then increasing to ST 120s with no PACs. Dr. Jorgensen notified. Orders for mag, K, and phos to be drawn. Potassium resulted at 3.9, 10 mEq replacement order by Dr. Jorgensen. Neurosurgery cross cover also notified of arrythmia. Mag 1.4, 2 g mag replacement order received from Dr. Jensen. Ionized calcium to be checked as well per MD. STAT EKG ordered per Dr. Jensen. Will continue to monitor.

## 2018-07-24 NOTE — PLAN OF CARE
Problem: Patient Care Overview  Goal: Plan of Care/Patient Progress Review  Outcome: No Change  POD #0 O-Arm/Stealth Assisted Lumbar 4-5 Decompression And Bilateral Transforaminal Interbody Fusion. Arrived at 6A at 1600. VSS on 2L nasal cannula. Capno WNL. A&Ox4. Neuro's intact. Incision dressing CDI. Hemovac and lizama catheter intact. Pt has not been up since procedure yet. Pt c/o moderate but tolerable pain at incision site/in back when moving; PRN dose of oxy given at 1745. Scheduled dose of tylenol given as well. Pt has hx of PONV; scopolamine patch in place. Pt has not been nauseous this shift; tolerates liquids well as of this time; could possibly advance to regular diet tonight. No BM since procedure. Continue to monitor and follow POC.

## 2018-07-24 NOTE — ANESTHESIA PROCEDURE NOTES
Arterial Line Procedure Note  Staff:     Anesthesiologist:  UVALDO SHORT  Location: In OR After Induction  Procedure Start/Stop Times:     patient identified, IV checked, site marked, risks and benefits discussed, informed consent, monitors and equipment checked, pre-op evaluation and at physician/surgeon's request      Correct Patient: Yes      Correct Position: Yes      Correct Site: Yes      Correct Procedure: Yes      Correct Laterality:  Yes    Site Marked:  Yes  Line Placement:     Procedure:  Arterial Line    Insertion Site:  Radial    Insertion laterality:  Left    Skin Prep: Chloraprep      Patient Prep: patient draped, mask, sterile gloves, hat and hand hygiene      Local skin infiltration:  None    Ultrasound Guided?: No      Catheter size:  20 gauge, 12 cm    Cath secured with: suture      Dressing:  Tegaderm    Complications:  None obvious    Arterial waveform: Yes      IBP within 10% of NIBP: Yes

## 2018-07-24 NOTE — ANESTHESIA POSTPROCEDURE EVALUATION
Patient: Kate Hartman    Procedure(s):  O-Arm/Stealth Assisted Lumbar 4-5 Decompression And Bilateral Transforaminal Interbody Fusion With Rodriguez-Cespedes Osteotomies, Autograft,  Allograft, Neuromonitoring Due To Cervical Spine Stenosis - Wound Class: I-Clean    Diagnosis:Spondylolisthesis At Lumbar 4-5 Level   Diagnosis Additional Information: No value filed.    Anesthesia Type:  General, ETT    Note:  Anesthesia Post Evaluation    Patient location during evaluation: PACU  Patient participation: Able to fully participate in evaluation  Level of consciousness: agitated  Pain management: adequate  Airway patency: patent  Cardiovascular status: acceptable  Respiratory status: acceptable  Hydration status: acceptable  PONV: none     Anesthetic complications: None          Last vitals:  Vitals:    07/24/18 1415 07/24/18 1430 07/24/18 1445   BP: 115/83 108/86 105/87   Pulse:      Resp: 16 16 16   Temp:      SpO2: 96% 99% 98%         Electronically Signed By: Princess Jorgensen MD  July 24, 2018  3:11 PM

## 2018-07-24 NOTE — OP NOTE
Procedure Date: 07/24/2018      PREOPERATIVE DIAGNOSES:   1.  L4 to L5 spondylolisthesis with radiculopathy.   2.  Flat back syndrome with pelvic incidence to lumbar lordosis mismatch.      POSTOPERATIVE DIAGNOSES:     1.  L4 to L5 spondylolisthesis with radiculopathy.   2.  Flat back syndrome with pelvic incidence to lumbar lordosis mismatch.      PROCEDURE PERFORMED:   1.  Stealth image-guided L4 to L5 posterior instrumented fusion, modifier 22 for twice normal procedural length due to difficult anatomy with triangulated L5 vertebral body and severely hypertrophic facets.   2.  Insertion of bilateral intervertebral fusion device, L4 to L5, with local harvest autograft.   3.  Bilateral Rodriguez-Cespedes osteotomies, L4 to L5, for kyphosis deformity correction with decompressive bilateral foraminotomies and laminectomies  4.  Bilateral L4 to L5 posterior lateral fusion with local harvest autograft and allograft.      SURGEON:  Fartun Ch MD      RESIDENT SURGEON:  Abel Dale MD      ESTIMATED BLOOD LOSS:  475 mL       CELL SAVER RETURN:  130 mL      INDICATIONS FOR SURGERY:  Kate Hartman is a very pleasant 62-year-old woman who presented with low back pain and right leg pain and was found to have a grade I, L4 to L5 spondylolisthesis with severe lateral recess and foraminal stenosis, refractory to conservative management.  She had a pelvic incidence of 63 degrees and a lumbar lordosis of 45 degrees for a mismatch of 18 degrees.  She also had a synovial cyst on the right at L4 to L5 causing lateral recess compression.  The patient had a non-symptomatic cervical stenosis, for which we planned to use neural monitoring to ensure no worsening with prone positioning.  After a full discussion of risks versus benefits, she elected to proceed with surgery for L4 to L5 posterior instrumented fusion with interbody fusion and Rodriguez-Cespedes osteotomies for correction of kyphosis at the L4 to L5 level.      OPERATIVE  COURSE AND INTRAOPERATIVE FINDINGS:  The patient was identified and informed consent was verified.  She was taken to operating room 22 where general endotracheal anesthesia was induced, a Zapata catheter was placed, an arterial line was placed to keep mean arterial pressures between 90 and 100 to ensure adequate spinal cord perfusion, and perioperative antibiotics were administered.  The patient was positioned prone on the ProAxis table and all extremity points, as well as her face, were carefully padded.  The ProAxis table was then placed into 10 degrees of flexion to ensure adequate exposure.  We then reverified the padding around her face was adequate.  The patient's lumbar spine was prepped and draped in standard sterile 3-step fashion.  An intraoperative timeout was performed.        A #10 blade scalpel was used to make skin incision and monopolar electrocautery was used to dissect the subcutaneous tissues to the fascia, which was incised in the midline, and careful subperiosteal dissection was carried out.  The fluoroscopy function on the O-arm was brought into the field to verify level for operation and we verified exposure.  We then proceeded to expose the posterolateral elements of L4 and L5.  Care was taken to avoid disrupting the L3 to L4 facet. There was extensive arthritic change throughout the facets noted at L4-5. Once the L4 to L5 posterior elements were exposed, the Stealth spinous process tracking arc was attached to the spinous process and an intraoperative neuronavigation CT scan was obtained.  We utilized this to place bilateral pedicle screws at L4 and L5.  The patient had a significant triangulation of the L5 vertebral body and this, combined with her significant body wall mass, made instrumentation placement significantly difficult and took twice as long as usual, necessitating a modifier 22 for instrumentation placement. Stealth was utilized to locate a starting point which was verified with  anatomic landmarks.  A drill was used to create a cortical starting point, followed by sharp-tipped awl usage to create a screw tract, which was palpated with a ball tip probe then tapped with a navigated tap, followed by screw placement.     The [employer] Broadband 5.56 Solera system was utilized with multiaxial pedicle screws and screw sizes were inserted as follows:  Bilateral L4, 6.5 x 50 mm; left L5, 7.5 x 40 mm; right L5, 6.5 x 50 mm.  The 1st intraoperative check CT spin indicated that the left L5 screw was slightly more lateral than ideal trajectory and therefore, this was redirected more medially with intraoperative imaging obtained to verify correct redirecting and correct trajectory.  We then proceeded to perform the bilateral complete facetectomies and laminectomies and foraminotomies for lateral recess and foraminal decompression, as well as Rodriguez-Cespedes osteotomies for deformity correction.  A Leksell rongeur was used to resect the interspinous ligament, followed by use of a wheeled laminar  to create flexion across the segment, with medial facetectomies performed with a Lambotte osteotome and a mallet, saving the local harvest autograft.  The superior articular facets were then resected flush with the L5 pedicle to expose the bilateral neural foramen, which were well decompressed following this.  The disk space was identified and entered using a #15 blade scalpel.  There was not a significant amount of disk material remaining, as the disk was significantly flat.  The remaining disk material was resected with the use of insert and rotate distractors, as well as pituitary rongeurs and endplate scrapers to prepare the endplates for fusion. Of note, the patient's bone quality was not strong. Insert and rotate distractors were used to size interbody grafts.  We were able to size up to a 13 mm x 18 degree lordotic interbody, but this appeared to create some endplate subsidence due to the patient's weakened  vertebral body and therefore, we elected to downsize the interbody to 10 mm high x 22 mm depth, 12 degree lordotic implants and these had good purchase and allowed us good lordosis correction.  We placed 1 interbody from the left and 1 interbody from the right after filling them with local harvest autograft.  We then used autograft, as well as supplemental allograft for additional packing bone in the disk space, and verified that decompression was completed in the lateral recesses and centrally with resection of the ligamentum flavum up to the insertion lateral ligamentum flavum and exploration of the right lateral recess to verify adequate decompression there, as well as the traversing right L5 nerve root decompression verified.  We then segmentally extended the ProAxis table in 5 degree increments, taking x-rays at +10 degrees, then closing the table by 5 degrees in increments from 5 degrees positive to 0 degrees to negative 5 degrees to negative 10 degrees, creating our final x-ray at negative 10 degree lordosis.  We verified that we had adequately restored 9 degrees of lordosis across the segment to get the patient's pelvic incidence and lumbar lordosis to within 10 degrees of each other, which was our goal. Adequate central and lateral recess decompression, as well as foraminal decompression, was verified using a Briscoe probe. We then placed short segment rods, performed slight additional compression, tightened the set plugs provisionally and obtained final AP and lateral x-rays.  X-rays were verified to be optimal and the set plugs were torqued off to the 's recommended torque.        The incision was irrigated with antibiotic-impregnated saline and hemostasis was verified.  The posterior lateral elements were further decorticated using a high-speed electric drill and the remaining local harvest autograft supplemented with allograft was used for posterior lateral fusion.    One gram of vancomycin  powder was sprinkled into the incision and the incision was closed with 0 Vicryl interrupted sutures in the fascia, followed by 0 Vicryl running suture in the fascia, followed by placement of a suprafascial Hemovac drain tunneled to exit the incision in a separate exit incision superiorly and to the left and secured to the skin with a 3-0 nylon.  The incision was further closed after placement of the remaining vancomycin powder in the suprafascial space with 2-0 Vicryl inverted interrupted sutures in the subcutaneous layer, followed by 2-0 Vicryl running suture in the subcutaneous layer, followed by 4-0 Monocryl subcuticular stitch on the skin, Dermabond and a sterile dressing.  All sponge and needle counts were correct prior to proceeding with closure and again at the conclusion of closure.  There were no intraoperative complications.      Neuromonitoring with somatosensory evoked potentials and motor evoked potentials for the patient's cervical spine stenosis stayed at baseline without any intraoperative changes.  We maintained her mean arterial pressure within a set range of  throughout the case to ensure adequate spinal cord perfusion.  At the conclusion of the procedure, the patient was positioned supine on the transfer gurney and taken to the PACU in stable neurologic condition, where I examined her and her neurologic exam was stable compared to baseline.  The patient will be admitted to the floor for further care.      I was scrubbed in from skin incision until dressing placement at the final stage of the procedure and I performed all portions of the operation personally.        ADEOLA GOMEZ MD             D: 2018   T: 2018   MT: RAY      Name:     MYA DORAN   MRN:      [id number]        Account:        [id number]   :      1955           Procedure Date: 2018      Document: [id number]

## 2018-07-24 NOTE — BRIEF OP NOTE
[facility], [place]    Brief Operative Note    Pre-operative diagnosis:   1. Spondylolisthesis At Lumbar 4-5 Level with radiculopathy  2. flatback syndrome with pelvic incidence to lumbar lordosis mismatch  3. Cervical spine stenosis    Post-operative diagnosis- same    Procedure: Procedure(s):  O-Arm/Stealth Assisted Lumbar 4-5 Decompression And Bilateral Transforaminal Interbody Fusion With Rodriguez-Cespedes Osteotomies, Autograft, Possible Allograft, Neuromonitoring Due To Cervical Spine Stenosis - Wound Class: I-Clean  Surgeon: Surgeon(s) and Role:     * Fartun Ch MD - Primary     * Abel Dale MD - Resident - Assisting  Anesthesia: General   Estimated blood loss: 475 mL  Drains: Hemovac  Specimens: * No specimens in log *  Findings:   Soft bone quality  Complications: None.  Implants: Metronic, see dictation.

## 2018-07-24 NOTE — IP AVS SNAPSHOT
[address] [address]    Phone:  [phone]                                       After Visit Summary   7/24/2018    Kate Hartman    MRN: [id number]           After Visit Summary Signature Page     I have received my discharge instructions, and my questions have been answered. I have discussed any challenges I see with this plan with the nurse or doctor.    ..........................................................................................................................................  Patient/Patient Representative Signature      ..........................................................................................................................................  Patient Representative Print Name and Relationship to Patient    ..................................................               ................................................  Date                                            Time    ..........................................................................................................................................  Reviewed by Signature/Title    ...................................................              ..............................................  Date                                                            Time

## 2018-07-24 NOTE — OR NURSING
EKG reviewed by Dr. Jensen. Pt ok to transfer to 6A per neurosurgery and Dr. Jorgensen. Will continue to monitor.

## 2018-07-25 ENCOUNTER — APPOINTMENT (OUTPATIENT)
Dept: OCCUPATIONAL THERAPY | Facility: CLINIC | Age: 63
End: 2018-07-25
Attending: STUDENT IN AN ORGANIZED HEALTH CARE EDUCATION/TRAINING PROGRAM
Payer: COMMERCIAL

## 2018-07-25 ENCOUNTER — APPOINTMENT (OUTPATIENT)
Dept: PHYSICAL THERAPY | Facility: CLINIC | Age: 63
End: 2018-07-25
Attending: STUDENT IN AN ORGANIZED HEALTH CARE EDUCATION/TRAINING PROGRAM
Payer: COMMERCIAL

## 2018-07-25 ENCOUNTER — APPOINTMENT (OUTPATIENT)
Dept: GENERAL RADIOLOGY | Facility: CLINIC | Age: 63
End: 2018-07-25
Attending: STUDENT IN AN ORGANIZED HEALTH CARE EDUCATION/TRAINING PROGRAM
Payer: COMMERCIAL

## 2018-07-25 LAB
ANION GAP SERPL CALCULATED.3IONS-SCNC: 7 MMOL/L (ref 3–14)
BUN SERPL-MCNC: 9 MG/DL (ref 7–30)
CALCIUM SERPL-MCNC: 8.5 MG/DL (ref 8.5–10.1)
CHLORIDE SERPL-SCNC: 100 MMOL/L (ref 94–109)
CO2 SERPL-SCNC: 28 MMOL/L (ref 20–32)
CREAT SERPL-MCNC: 0.71 MG/DL (ref 0.52–1.04)
ERYTHROCYTE [DISTWIDTH] IN BLOOD BY AUTOMATED COUNT: 12.7 % (ref 10–15)
GFR SERPL CREATININE-BSD FRML MDRD: 84 ML/MIN/1.7M2
GLUCOSE SERPL-MCNC: 110 MG/DL (ref 70–99)
HCT VFR BLD AUTO: 33.5 % (ref 35–47)
HGB BLD-MCNC: 10.7 G/DL (ref 11.7–15.7)
INTERPRETATION ECG - MUSE: NORMAL
MAGNESIUM SERPL-MCNC: 2.9 MG/DL (ref 1.6–2.3)
MAGNESIUM SERPL-MCNC: 3.6 MG/DL (ref 1.6–2.3)
MCH RBC QN AUTO: 34.2 PG (ref 26.5–33)
MCHC RBC AUTO-ENTMCNC: 31.9 G/DL (ref 31.5–36.5)
MCV RBC AUTO: 107 FL (ref 78–100)
PLATELET # BLD AUTO: 272 10E9/L (ref 150–450)
POTASSIUM SERPL-SCNC: 4.6 MMOL/L (ref 3.4–5.3)
RBC # BLD AUTO: 3.13 10E12/L (ref 3.8–5.2)
SODIUM SERPL-SCNC: 135 MMOL/L (ref 133–144)
WBC # BLD AUTO: 7.1 10E9/L (ref 4–11)

## 2018-07-25 PROCEDURE — 25000132 ZZH RX MED GY IP 250 OP 250 PS 637: Performed by: STUDENT IN AN ORGANIZED HEALTH CARE EDUCATION/TRAINING PROGRAM

## 2018-07-25 PROCEDURE — 97110 THERAPEUTIC EXERCISES: CPT | Mod: GP

## 2018-07-25 PROCEDURE — 97530 THERAPEUTIC ACTIVITIES: CPT | Mod: GP

## 2018-07-25 PROCEDURE — 97116 GAIT TRAINING THERAPY: CPT | Mod: GP

## 2018-07-25 PROCEDURE — 72100 X-RAY EXAM L-S SPINE 2/3 VWS: CPT

## 2018-07-25 PROCEDURE — 80048 BASIC METABOLIC PNL TOTAL CA: CPT | Performed by: STUDENT IN AN ORGANIZED HEALTH CARE EDUCATION/TRAINING PROGRAM

## 2018-07-25 PROCEDURE — 83735 ASSAY OF MAGNESIUM: CPT | Performed by: STUDENT IN AN ORGANIZED HEALTH CARE EDUCATION/TRAINING PROGRAM

## 2018-07-25 PROCEDURE — 40000193 ZZH STATISTIC PT WARD VISIT

## 2018-07-25 PROCEDURE — 12000008 ZZH R&B INTERMEDIATE UMMC

## 2018-07-25 PROCEDURE — 85027 COMPLETE CBC AUTOMATED: CPT | Performed by: STUDENT IN AN ORGANIZED HEALTH CARE EDUCATION/TRAINING PROGRAM

## 2018-07-25 PROCEDURE — 25000128 H RX IP 250 OP 636: Performed by: STUDENT IN AN ORGANIZED HEALTH CARE EDUCATION/TRAINING PROGRAM

## 2018-07-25 PROCEDURE — 25000125 ZZHC RX 250: Performed by: STUDENT IN AN ORGANIZED HEALTH CARE EDUCATION/TRAINING PROGRAM

## 2018-07-25 PROCEDURE — 97535 SELF CARE MNGMENT TRAINING: CPT | Mod: GO | Performed by: OCCUPATIONAL THERAPIST

## 2018-07-25 PROCEDURE — 83735 ASSAY OF MAGNESIUM: CPT | Performed by: NEUROLOGICAL SURGERY

## 2018-07-25 PROCEDURE — 40000133 ZZH STATISTIC OT WARD VISIT: Performed by: OCCUPATIONAL THERAPIST

## 2018-07-25 PROCEDURE — 25000132 ZZH RX MED GY IP 250 OP 250 PS 637: Performed by: NURSE PRACTITIONER

## 2018-07-25 PROCEDURE — 36415 COLL VENOUS BLD VENIPUNCTURE: CPT | Performed by: NEUROLOGICAL SURGERY

## 2018-07-25 PROCEDURE — 36415 COLL VENOUS BLD VENIPUNCTURE: CPT | Performed by: STUDENT IN AN ORGANIZED HEALTH CARE EDUCATION/TRAINING PROGRAM

## 2018-07-25 PROCEDURE — 97161 PT EVAL LOW COMPLEX 20 MIN: CPT | Mod: GP

## 2018-07-25 PROCEDURE — 97165 OT EVAL LOW COMPLEX 30 MIN: CPT | Mod: GO | Performed by: OCCUPATIONAL THERAPIST

## 2018-07-25 RX ORDER — CYCLOBENZAPRINE HCL 10 MG
10 TABLET ORAL 3 TIMES DAILY
Status: DISCONTINUED | OUTPATIENT
Start: 2018-07-25 | End: 2018-07-26 | Stop reason: HOSPADM

## 2018-07-25 RX ORDER — BISACODYL 5 MG
10 TABLET, DELAYED RELEASE (ENTERIC COATED) ORAL ONCE
Status: DISCONTINUED | OUTPATIENT
Start: 2018-07-25 | End: 2018-07-26 | Stop reason: HOSPADM

## 2018-07-25 RX ORDER — MAGNESIUM CARB/ALUMINUM HYDROX 105-160MG
296 TABLET,CHEWABLE ORAL ONCE
Status: COMPLETED | OUTPATIENT
Start: 2018-07-26 | End: 2018-07-26

## 2018-07-25 RX ADMIN — Medication 220 MG: at 07:45

## 2018-07-25 RX ADMIN — OXYCODONE HYDROCHLORIDE 10 MG: 5 TABLET ORAL at 04:06

## 2018-07-25 RX ADMIN — METOPROLOL TARTRATE 50 MG: 50 TABLET ORAL at 07:45

## 2018-07-25 RX ADMIN — Medication 250 MG: at 07:46

## 2018-07-25 RX ADMIN — POLYETHYLENE GLYCOL 3350 17 G: 17 POWDER, FOR SOLUTION ORAL at 07:45

## 2018-07-25 RX ADMIN — HYDROCHLOROTHIAZIDE 25 MG: 25 TABLET ORAL at 07:46

## 2018-07-25 RX ADMIN — VITAMIN D, TAB 1000IU (100/BT) 1000 UNITS: 25 TAB at 07:46

## 2018-07-25 RX ADMIN — CYCLOBENZAPRINE HYDROCHLORIDE 10 MG: 10 TABLET, FILM COATED ORAL at 10:24

## 2018-07-25 RX ADMIN — Medication 0.3 MG: at 06:38

## 2018-07-25 RX ADMIN — ACETAMINOPHEN 975 MG: 325 TABLET, FILM COATED ORAL at 00:17

## 2018-07-25 RX ADMIN — VITAMIN E CAP 100 UNIT 100 UNITS: 100 CAP at 07:46

## 2018-07-25 RX ADMIN — CYCLOBENZAPRINE HYDROCHLORIDE 10 MG: 10 TABLET, FILM COATED ORAL at 20:55

## 2018-07-25 RX ADMIN — LOSARTAN POTASSIUM 50 MG: 50 TABLET ORAL at 07:46

## 2018-07-25 RX ADMIN — OXYCODONE HYDROCHLORIDE 10 MG: 5 TABLET ORAL at 15:00

## 2018-07-25 RX ADMIN — SENNOSIDES AND DOCUSATE SODIUM 3 TABLET: 8.6; 5 TABLET ORAL at 07:45

## 2018-07-25 RX ADMIN — ACETAMINOPHEN 975 MG: 325 TABLET, FILM COATED ORAL at 07:46

## 2018-07-25 RX ADMIN — SENNOSIDES AND DOCUSATE SODIUM 3 TABLET: 8.6; 5 TABLET ORAL at 20:54

## 2018-07-25 RX ADMIN — CYCLOBENZAPRINE HYDROCHLORIDE 10 MG: 10 TABLET, FILM COATED ORAL at 14:40

## 2018-07-25 RX ADMIN — ACETAMINOPHEN 975 MG: 325 TABLET, FILM COATED ORAL at 16:20

## 2018-07-25 RX ADMIN — OXYCODONE HYDROCHLORIDE 10 MG: 5 TABLET ORAL at 07:45

## 2018-07-25 RX ADMIN — OXYCODONE HYDROCHLORIDE 10 MG: 5 TABLET ORAL at 22:12

## 2018-07-25 RX ADMIN — ONDANSETRON 8 MG: 4 TABLET, ORALLY DISINTEGRATING ORAL at 08:01

## 2018-07-25 RX ADMIN — OXYCODONE HYDROCHLORIDE 10 MG: 5 TABLET ORAL at 18:08

## 2018-07-25 RX ADMIN — MULTIPLE VITAMINS W/ MINERALS TAB 1 TABLET: TAB at 07:46

## 2018-07-25 RX ADMIN — LIDOCAINE 1 PATCH: 560 PATCH PERCUTANEOUS; TOPICAL; TRANSDERMAL at 20:55

## 2018-07-25 RX ADMIN — METOPROLOL TARTRATE 50 MG: 50 TABLET ORAL at 20:55

## 2018-07-25 RX ADMIN — OXYCODONE HYDROCHLORIDE 10 MG: 5 TABLET ORAL at 12:00

## 2018-07-25 RX ADMIN — Medication 10000 UNITS: at 07:46

## 2018-07-25 ASSESSMENT — ACTIVITIES OF DAILY LIVING (ADL)
PREVIOUS_RESPONSIBILITIES: MEAL PREP;HOUSEKEEPING;LAUNDRY;SHOPPING;MEDICATION MANAGEMENT;FINANCES;DRIVING;WORK
ADLS_ACUITY_SCORE: 11

## 2018-07-25 NOTE — PLAN OF CARE
Problem: Patient Care Overview  Goal: Plan of Care/Patient Progress Review  Discharge Planner PT   Patient plan for discharge: Home with assist   Current status: Pt ambulates 2x200' with RW SBA, navigates x3 stairs with L railing CGA. She requires continued education on spinal precautions and is SBA bed mobility and transfers to RW including toileting. Pt participates in seated LE exercises. Lengthy discussion with pt and  on discharge planning as their main home has 2 flights of stairs she must take access bed/bath. [place] also own [place] with 4 steps to enter and then can be all on one level. [facility] is two hours away and family willing to transport all pt's personal belongs their for ~2 weeks for ease. Pt and family wondering if they can still get HH PT at location of [facility].  Barriers to return to prior living situation: inaccessible home environment, medical needs, reduced activity tolerance  Recommendations for discharge: Home with assist + HEP, HHPT  Rationale for recommendations: Pt would benefit from ongoing therapy to address strength, ROM, balance, and activity tolerance deficits in order to progress IND mobility and progress towards PLOF.       Entered by: Esthela Cunningham 07/25/2018 5:05 PM

## 2018-07-25 NOTE — PLAN OF CARE
Problem: Patient Care Overview  Goal: Plan of Care/Patient Progress Review    Status: Pt on 6A POD #1 s/p Lumbar 4-5 Decompression And Bilateral Transforaminal Interbody Fusion  VS: VSS on 1L, weaning down  Neuros: Intact  GI: C/o nausea at beginning of shift, but has improved overnight and hasn't needed intervention. Diet advanced to regular. No BM this shift, received senna and miralax.  : Zapata in place until POD #2, good output.     IV: MIVF at 75 mL/hr.   Activity: Not OOB, has dangled. Motivated to get OOB today if pain is well controlled.   Pain: C/o pain to surgical site with moderate control with oxycodone. Received dilaudid x1 this AM. On list to get a muscle relaxer ordered.    Skin: Incision to lumbar spine covered with dressing, CDI, no drainage noted.   Labs/Tests: Lytes being redrawn this AM.   Social:  will be here later this AM.   Plan of care: Continue to monitor and follow current POC. Continue to work on better pain control.

## 2018-07-25 NOTE — PROGRESS NOTES
"S: Radicular pain has resolved. Mild dull pain in lower back only persists.    O:  Exam:  General: Awake;  Alert, In No Acute Distress  Pulm: Breathing Comfortably on 1 LPM  Mental status: Oriented x 3  Cranial Nerves: Cranial Nerves II-XII Intact Bilaterally  Strength:      Del Tr Bi WE WF Gr  R 5 5 5 5 5 5  L 5 5 5 5 5 5     HF KE KF DF PF EHL  R 4-* 5 5 5 5 5  L 5 5 5 5 5 5  *Back pain limited    Sensory: Intact to Light Touch  INCISION: covered with aquacell    Assessment:   #lumbar radiculopathy s/p Lumbar fusion without complication     Plan by problem:     Neuro:   Post-operative pain: pain controlled on oral analgesics   Sutures absorbable   Drains: Surgical hemovac drain   Required imaging:  Lumbar post-operative  XR pending     Cardiovascular  Blood pressure goals: SBP < 160    Pulmonary:   Incentive spirometry     Gastrointestinal: Advance as tolerated     Renal: discontinue lizama post operative day #1    Heme/Endocrine/Infectious: no issues     PT/OT: pending    DVT prophylaxis: Sequential compression devices    Ulcer prophylaxis: none indicated    DISPO:  Anticipate D/C Home 7/27    Barriers: Surgical drain, evaluation by therapies, ambulating, BM/flatus, voiding without a Lizama, pain controlled on PO medications    Solorzano \"Guillermo\" MD [place]   Neurosurgery, PGY-2    Please contact neurosurgery resident on call with questions.    Dial * * [phone], enter [phone] when prompted.     "

## 2018-07-25 NOTE — PROGRESS NOTES
07/25/18 1119   Quick Adds   Type of Visit Initial Occupational Therapy Evaluation   Living Environment   Lives With spouse   [place]   Home Accessibility stairs to enter home;stairs within home;bed not on first floor   Number of Stairs to Enter Home 4   Number of Stairs Within Home 24  (2 flights of stairs)   Transportation Available family or friend will provide;car   Living Environment Comment Pt has two homes as possible discharge locations. First is local [facility] with 2 flights of stairs (up from garage and to upper level with bed/bath), pt also able to discharge to single level home in [place], [place] (~2 hours away) with only 4 steps to enter. Spouse plans on taking atleast 2 weeks off work to assist following discharge.    Self-Care   Dominant Hand right   Usual Activity Tolerance moderate   Current Activity Tolerance fair   Regular Exercise yes   Activity/Exercise Type (OP PT )   Equipment Currently Used at Home none  (However does have FWW, RTS, shower chair and cane )   Activity/Exercise/Self-Care Comment Pt works as , 1day/week with exception of full time jan-april   Functional Level Prior   Ambulation 0-->independent   Transferring 0-->independent   Toileting 0-->independent   Bathing 0-->independent   Dressing 0-->independent   Eating 0-->independent   Communication 0-->understands/communicates without difficulty   Swallowing 0-->swallows foods/liquids without difficulty   Cognition 0 - no cognition issues reported   Fall history within last six months no   Which of the above functional risks had a recent onset or change? none   Prior Functional Level Comment Pt reports IND with ADLs, does require assist with carrying object and heavy household tasks in recent months.   General Information   Onset of Illness/Injury or Date of Surgery - Date 07/24/18   Referring Physician Abel Dale MD   Patient/Family Goals Statement d/c to home   Additional Occupational  Profile Info/Pertinent History of Current Problem s/p Lumbar 4-5 Decompression And Bilateral Transforaminal Interbody Fusion    Precautions/Limitations spinal precautions;fall precautions   Weight-Bearing Status - LUE (10# limit per spinal prec)   Weight-Bearing Status - RUE other (see comments)  (10# limit per spinal prec)   Weight-Bearing Status - LLE full weight-bearing   Weight-Bearing Status - RLE full weight-bearing   General Observations AVSS on 1 L SpO2, drain, fole   General Info Comments Activity: Ambulate with assist   Cognitive Status Examination   Orientation orientation to person, place and time   Level of Consciousness alert   Cognitive Comment No deficits identified   Visual Perception   Visual Perception Wears glasses   Visual Perception Comments No deficits identified   Pain Assessment   Patient Currently in Pain Yes, see Vital Sign flowsheet   Range of Motion (ROM)   ROM Comment BUE WFL   Strength   Strength Comments BUE NT 2/2 spinal prec   Hand Strength   Hand Strength Comments WNL, symmetrical   Coordination   Coordination Comments finger to nose intact   Mobility   Bed Mobility Bed mobility skill: Supine to sit   Bed Mobility Skill: Supine to Sit   Level of [place]: Supine/Sit minimum assist (75% patients effort)   Physical Assist/Nonphysical Assist: Supine/Sit 1 person assist   Assistive Device: Supine/Sit bedrail   Transfer Skill: Bed to Chair/Chair to Bed   Level of [place]: Bed to Chair contact guard   Transfer Skill: Sit to Stand   Level of [place]: Sit/Stand stand-by assist   Transfer Skill: Toilet Transfer   Level of [place]: Toilet contact guard   Lower Body Dressing   Level of [place]: Dress Lower Body maximum assist (25% patients effort)   Toileting   Level of [place]: Toilet stand-by assist   Grooming   Level of [place]: Grooming stand-by assist   Eating/Self Feeding   Level of [place]: Eating independent   Instrumental Activities of Daily  "Living (IADL)   Previous Responsibilities meal prep;housekeeping;laundry;shopping;medication management;finances;driving;work   IADL Comments Requiring increased assist with IADLs in recent months due to back pain   Activities of Daily Living Analysis   Impairments Contributing to Impaired Activities of Daily Living balance impaired;flexibility decreased;pain;post surgical precautions;ROM decreased;strength decreased   General Therapy Interventions   Planned Therapy Interventions ADL retraining;IADL retraining;bed mobility training;transfer training;home program guidelines;progressive activity/exercise;risk factor education   Clinical Impression   Criteria for Skilled Therapeutic Interventions Met yes, treatment indicated   OT Diagnosis Decreased ADL independence   Influenced by the following impairments pain, post op precautions   Assessment of Occupational Performance 3-5 Performance Deficits   Identified Performance Deficits dressing, bathing, functional mobility, home mgmt, meal prep   Clinical Decision Making (Complexity) Low complexity   Therapy Frequency (6x/week)   Predicted Duration of Therapy Intervention (days/wks) 1 week   Anticipated Equipment Needs at Discharge (TBD during OT sessions)   Anticipated Discharge Disposition Home with Assist   Risks and Benefits of Treatment have been explained. Yes   Patient, Family & other staff in agreement with plan of care Yes   Clinical Impression Comments Pt demos decreased indep with ADLs post op due to pain and post op precautions. Will benefit from skilled OT to address current deficits and increase independence   [facility] AM-PAC TM \"6 Clicks\"   2016, Trustees of [facility], under license to [employer].  All rights reserved.   6 Clicks Short Forms Daily Activity Inpatient Short Form   [facility] AM-PAC  \"6 Clicks\" Daily Activity Inpatient Short Form   1. Putting on and taking off regular lower body clothing? 2 - A Lot   2. Bathing " (including washing, rinsing, drying)? 3 - A Little   3. Toileting, which includes using toilet, bedpan or urinal? 3 - A Little   4. Putting on and taking off regular upper body clothing? 4 - None   5. Taking care of personal grooming such as brushing teeth? 4 - None   6. Eating meals? 4 - None   Daily Activity Raw Score (Score out of 24.Lower scores equate to lower levels of function) 20   Total Evaluation Time   Total Evaluation Time (Minutes) 10

## 2018-07-25 NOTE — PLAN OF CARE
Problem: Patient Care Overview  Goal: Plan of Care/Patient Progress Review  Discharge Planner OT   Patient plan for discharge: Home with spouse assist   Current status: OT evaluation completed. Educated in spinal precautions and implications for ADLs. Pt performing bed mobility with log rolling with min A, sit<>stand with SBA, ambulates bed<>bath with CGA and fww. Pt currently requires max A for LB ADLs, SBA for seated g/h. Asymptomatic orthostatic hypotension supine 117/75, sitting 106/64, standing 99/65.  Will benefit from skilled OT for ADL training within precautions to increase indep post op.   Barriers to return to prior living situation: stairs with PT, medical readiness, activity tolerance  Recommendations for discharge: Anticipate home with assist  Rationale for recommendations: with continued therapy and OOB activity.  Do not anticipate further OT needs following acute stay, will likely require ongoing PT however defer to PT eval for determination of home vs. OP.  Pt has 2 homes as possible discharge location. First home is [facility] with 2 full flights of stairs, second [place] with single level living [place] 2 hours away.         Entered by: Maria Elena Yuen 07/25/2018 11:10 AM

## 2018-07-25 NOTE — PROGRESS NOTES
07/25/18 1522   Quick Adds   Type of Visit Initial PT Evaluation   Living Environment   Lives With spouse   Living Arrangements house   Home Accessibility stairs to enter home;stairs within home;bed not on first floor   Number of Stairs to Enter Home 4   Number of Stairs Within Home 24   Stair Railings at Home outside, present at both sides;inside, present on right side   Transportation Available family or friend will provide   Living Environment Comment Pt has two homes as possible discharge locations. First is [facility] with 2 flights of stairs (up from garage and to upper level with bed/bath), pt also able to discharge to single level home in [place], [place] (~2 hours away) with only 4 steps to enter. Spouse plans on taking atleast 2 weeks off work to assist following discharge.    Self-Care   Dominant Hand right   Usual Activity Tolerance moderate   Current Activity Tolerance fair   Regular Exercise yes   Activity/Exercise Type (OP PT)   Equipment Currently Used at Home none   Activity/Exercise/Self-Care Comment  Pt works as , 1day/week with exception of full time jan-april    Functional Level Prior   Ambulation 0-->independent   Transferring 0-->independent   Toileting 0-->independent   Bathing 0-->independent   Dressing 0-->independent   Eating 0-->independent   Communication 0-->understands/communicates without difficulty   Swallowing 0-->swallows foods/liquids without difficulty   Cognition 0 - no cognition issues reported   Fall history within last six months no   Which of the above functional risks had a recent onset or change? none   Prior Functional Level Comment Pt previously complete IND with all cares and mobility   General Information   Onset of Illness/Injury or Date of Surgery - Date 07/24/18   Referring Physician Abel Dale MD   Patient/Family Goals Statement go home   Pertinent History of Current Problem (include personal factors and/or comorbidities that impact the POC) POD  #1 s/p Lumbar 4-5 Decompression And Bilateral Transforaminal Interbody Fusion   Precautions/Limitations spinal precautions   Weight-Bearing Status - LUE full weight-bearing   Weight-Bearing Status - RUE full weight-bearing   Weight-Bearing Status - LLE full weight-bearing   Weight-Bearing Status - RLE full weight-bearing   General Observations activity orders: up with assist   Cognitive Status Examination   Orientation orientation to person, place and time   Level of Consciousness alert   Follows Commands and Answers Questions 100% of the time   Personal Safety and Judgment intact   Memory intact   Pain Assessment   Patient Currently in Pain Yes, see Vital Sign flowsheet   Integumentary/Edema   Integumentary/Edema Comments none noted   Posture    Posture Comments forward hunch   Range of Motion (ROM)   ROM Comment limited trunk ROM due to spinal pain   Strength   Strength Comments B LE >3/5 WFL, unable to formally assess due to precautions and pain   Bed Mobility   Bed Mobility Comments Pt is SBA bed mobility with cues for technique needed   Transfer Skills   Transfer Comments Pt is SBA sit<>stand to RW   Gait   Gait Comments Pt ambulates x10' with RW demonstrating toe-out pattern B, reduced knee flexion in swing, and general stiffness   Balance   Balance Comments Pt with good balance overall, she completes standing pericares SBA with single UE support   Sensory Examination   Sensory Perception Comments Pt reports no numbness/tingling   General Therapy Interventions   Planned Therapy Interventions balance training;bed mobility training;gait training;neuromuscular re-education;ROM;strengthening;transfer training;home program guidelines;progressive activity/exercise   Clinical Impression   Criteria for Skilled Therapeutic Intervention yes, treatment indicated   PT Diagnosis impaired functional mobility   Influenced by the following impairments strength, activity tolerance, ROM, balance   Functional limitations due to  "impairments bed mobility, gait, stairs, transfers, bathing, toiletting   Clinical Presentation Stable/Uncomplicated   Clinical Presentation Rationale per clinician impression   Clinical Decision Making (Complexity) Low complexity   Therapy Frequency` 5 times/week   Predicted Duration of Therapy Intervention (days/wks) 1 week   Anticipated Equipment Needs at Discharge reacher;other (see comments)  (sock aide: pt already pursches RW, shower chair,)   Anticipated Discharge Disposition Home with Assist;Home with Home Therapy   Risk & Benefits of therapy have been explained Yes   Patient, Family & other staff in agreement with plan of care Yes   Clinical Impression Comments eval complete, treatment indicated   [facility] AM-PAC TM \"6 Clicks\"   2016, Trustees of [facility], under license to [employer].  All rights reserved.   6 Clicks Short Forms Basic Mobility Inpatient Short Form   [facility]-PAC  \"6 Clicks\" V.2 Basic Mobility Inpatient Short Form   1. Turning from your back to your side while in a flat bed without using bedrails? 4 - None   2. Moving from lying on your back to sitting on the side of a flat bed without using bedrails? 3 - A Little   3. Moving to and from a bed to a chair (including a wheelchair)? 3 - A Little   4. Standing up from a chair using your arms (e.g., wheelchair, or bedside chair)? 3 - A Little   5. To walk in hospital room? 3 - A Little   6. Climbing 3-5 steps with a railing? 3 - A Little   Basic Mobility Raw Score (Score out of 24.Lower scores equate to lower levels of function) 19   Total Evaluation Time   Total Evaluation Time (Minutes) 10     "

## 2018-07-25 NOTE — PLAN OF CARE
Problem: Patient Care Overview  Goal: Plan of Care/Patient Progress Review  Pt on 6A POD #1 s/p Lumbar 4-5 Decompression And Bilateral Transforaminal Interbody Fusion. VSS on 1L, weaning down. Neuros Intact. C/o nausea at beginning of shift, relieved w/zofran. Diet advanced to regular. No BM this shift, received senna and miralax. Zapata removed this morning at 9am, now voiding spont. PIV SL. Up w/SBA + walker. C/o pain to surgical site with moderate control with oxycodone, flexiril, and scheduled Tylenol; icy hot patches ordered from pharmacy. Incision to lumbar spine covered with dressing, CDI, no drainage noted. Continue to monitor and follow current POC.

## 2018-07-26 ENCOUNTER — APPOINTMENT (OUTPATIENT)
Dept: OCCUPATIONAL THERAPY | Facility: CLINIC | Age: 63
End: 2018-07-26
Attending: NEUROLOGICAL SURGERY
Payer: COMMERCIAL

## 2018-07-26 ENCOUNTER — APPOINTMENT (OUTPATIENT)
Dept: PHYSICAL THERAPY | Facility: CLINIC | Age: 63
End: 2018-07-26
Attending: NEUROLOGICAL SURGERY
Payer: COMMERCIAL

## 2018-07-26 VITALS
HEIGHT: 61 IN | TEMPERATURE: 96.8 F | SYSTOLIC BLOOD PRESSURE: 91 MMHG | DIASTOLIC BLOOD PRESSURE: 56 MMHG | OXYGEN SATURATION: 98 % | BODY MASS INDEX: 28.05 KG/M2 | HEART RATE: 65 BPM | WEIGHT: 148.59 LBS | RESPIRATION RATE: 16 BRPM

## 2018-07-26 PROCEDURE — 40000193 ZZH STATISTIC PT WARD VISIT

## 2018-07-26 PROCEDURE — 97530 THERAPEUTIC ACTIVITIES: CPT | Mod: GO

## 2018-07-26 PROCEDURE — 25000132 ZZH RX MED GY IP 250 OP 250 PS 637: Performed by: STUDENT IN AN ORGANIZED HEALTH CARE EDUCATION/TRAINING PROGRAM

## 2018-07-26 PROCEDURE — 97535 SELF CARE MNGMENT TRAINING: CPT | Mod: GO

## 2018-07-26 PROCEDURE — 40000133 ZZH STATISTIC OT WARD VISIT

## 2018-07-26 PROCEDURE — 25000132 ZZH RX MED GY IP 250 OP 250 PS 637: Performed by: NURSE PRACTITIONER

## 2018-07-26 PROCEDURE — 97116 GAIT TRAINING THERAPY: CPT | Mod: GP

## 2018-07-26 RX ORDER — AMOXICILLIN 250 MG
3 CAPSULE ORAL 2 TIMES DAILY
Qty: 100 TABLET | Refills: 0 | Status: SHIPPED | OUTPATIENT
Start: 2018-07-26

## 2018-07-26 RX ORDER — OXYCODONE HYDROCHLORIDE 5 MG/1
5-10 TABLET ORAL
Qty: 75 TABLET | Refills: 0 | Status: SHIPPED | OUTPATIENT
Start: 2018-07-26

## 2018-07-26 RX ORDER — MAGNESIUM CARB/ALUMINUM HYDROX 105-160MG
296 TABLET,CHEWABLE ORAL ONCE
Status: DISCONTINUED | OUTPATIENT
Start: 2018-07-26 | End: 2018-07-26 | Stop reason: HOSPADM

## 2018-07-26 RX ORDER — CYCLOBENZAPRINE HCL 10 MG
10 TABLET ORAL 3 TIMES DAILY
Qty: 45 TABLET | Refills: 0 | Status: SHIPPED | OUTPATIENT
Start: 2018-07-26 | End: 2018-08-07

## 2018-07-26 RX ORDER — POLYETHYLENE GLYCOL 3350 17 G/17G
17 POWDER, FOR SOLUTION ORAL 3 TIMES DAILY
Qty: 30 PACKET | Refills: 0 | Status: SHIPPED | OUTPATIENT
Start: 2018-07-26

## 2018-07-26 RX ADMIN — CYCLOBENZAPRINE HYDROCHLORIDE 10 MG: 10 TABLET, FILM COATED ORAL at 15:29

## 2018-07-26 RX ADMIN — VITAMIN D, TAB 1000IU (100/BT) 1000 UNITS: 25 TAB at 08:14

## 2018-07-26 RX ADMIN — MAGNESIUM CITRATE 296 ML: 1.75 LIQUID ORAL at 08:17

## 2018-07-26 RX ADMIN — Medication 250 MG: at 08:14

## 2018-07-26 RX ADMIN — CYCLOBENZAPRINE HYDROCHLORIDE 10 MG: 10 TABLET, FILM COATED ORAL at 08:15

## 2018-07-26 RX ADMIN — ACETAMINOPHEN 975 MG: 325 TABLET, FILM COATED ORAL at 00:00

## 2018-07-26 RX ADMIN — BISACODYL 10 MG: 10 SUPPOSITORY RECTAL at 13:28

## 2018-07-26 RX ADMIN — VITAMIN E CAP 100 UNIT 100 UNITS: 100 CAP at 08:15

## 2018-07-26 RX ADMIN — OXYCODONE HYDROCHLORIDE 10 MG: 5 TABLET ORAL at 04:20

## 2018-07-26 RX ADMIN — MULTIPLE VITAMINS W/ MINERALS TAB 1 TABLET: TAB at 08:15

## 2018-07-26 RX ADMIN — OXYCODONE HYDROCHLORIDE 10 MG: 5 TABLET ORAL at 01:13

## 2018-07-26 RX ADMIN — HYDROCHLOROTHIAZIDE 25 MG: 25 TABLET ORAL at 08:14

## 2018-07-26 RX ADMIN — OXYCODONE HYDROCHLORIDE 10 MG: 5 TABLET ORAL at 08:14

## 2018-07-26 RX ADMIN — SENNOSIDES AND DOCUSATE SODIUM 3 TABLET: 8.6; 5 TABLET ORAL at 08:15

## 2018-07-26 RX ADMIN — Medication 220 MG: at 08:15

## 2018-07-26 RX ADMIN — OXYCODONE HYDROCHLORIDE 10 MG: 5 TABLET ORAL at 11:39

## 2018-07-26 RX ADMIN — LOSARTAN POTASSIUM 50 MG: 50 TABLET ORAL at 08:14

## 2018-07-26 RX ADMIN — METOPROLOL TARTRATE 50 MG: 50 TABLET ORAL at 08:15

## 2018-07-26 RX ADMIN — Medication 10000 UNITS: at 08:15

## 2018-07-26 RX ADMIN — POLYETHYLENE GLYCOL 3350 17 G: 17 POWDER, FOR SOLUTION ORAL at 13:00

## 2018-07-26 RX ADMIN — ACETAMINOPHEN 975 MG: 325 TABLET, FILM COATED ORAL at 15:29

## 2018-07-26 RX ADMIN — OXYCODONE HYDROCHLORIDE 10 MG: 5 TABLET ORAL at 15:29

## 2018-07-26 RX ADMIN — ACETAMINOPHEN 975 MG: 325 TABLET, FILM COATED ORAL at 08:14

## 2018-07-26 ASSESSMENT — ACTIVITIES OF DAILY LIVING (ADL)
ADLS_ACUITY_SCORE: 11

## 2018-07-26 ASSESSMENT — PAIN DESCRIPTION - DESCRIPTORS: DESCRIPTORS: SHOOTING;SPASM;ACHING

## 2018-07-26 NOTE — PLAN OF CARE
Problem: Patient Care Overview  Goal: Plan of Care/Patient Progress Review  Outcome: No Change  Status: POD#2 s/p L4-5 decompression and bilateral transforaminal interbody fusion.   VS: Hypotensive this afternoon (70s/40s), pt laid flat for awhile and BP resolved on its own (last check 91/56). Pt asymptomatic during this time. Was requiring 2L O2 while sleeping, has been off O2 for most of day.   Neuros: generalized weakness.   GI: Regular diet, fair PO. Pt encouraged to drink more fluids. Had a large BM this afternoon after receiving a suppository.   : Voiding adequate amounts.   IV: PIV x2 SL.   Activity: Up SBA w/ walker and GB.   Pain: Controlled w/ scheduled flexeril and prn oxy q3hrs.   Skin: Hemovac removed this morning. Back incision intact w/ aquacell.   Social:  present, supportive.   Plan of care: Discharge orders in place, pt's  will be here around 4pm to pick her up.

## 2018-07-26 NOTE — PROGRESS NOTES
Neurosurgery Progress Note    Ms. Hartman is a 62 year old woman who underwent L4-5 decompression and bilateral TLIF with smith-rosas osteotomy on 7/24/18. Post-operatively, she had back pain issues but otherwise been doing well.    S: Continued dull back pain.    O:  Exam:  General: Awake;  Alert, In No Acute Distress  Pulm: Breathing Comfortably on RA  Mental status: Oriented x 3  Cranial Nerves: Cranial Nerves II-XII Intact Bilaterally  Strength:      Del Tr Bi WE WF Gr  R 5 5 5 5 5 5  L 5 5 5 5 5 5     HF KE KF DF PF EHL  R 4-* 5 5 5 5 5  L 4-* 5 5 5 5 5  *Back pain limited    Sensory: Intact to Light Touch  INCISION: covered with aquacell    Assessment:   #lumbar radiculopathy s/p Lumbar fusion without complication     Plan by problem:     Neuro:   Post-operative pain: pain controlled on oral analgesics   Sutures absorbable   Drains: remove today  Required imaging:  completed    Cardiovascular  Blood pressure goals: SBP < 160    Pulmonary:   Incentive spirometry     Gastrointestinal: Advance as tolerated     Renal: voiding without Zapata    Heme/Endocrine/Infectious: no issues     PT/OT: HWA w/ HEP and HHPT    DVT prophylaxis: Sequential compression devices    Ulcer prophylaxis: none indicated    DISPO:  Anticipate D/C Home 7/26    Barriers: none    -----------------------------------  Abel Dale MD, MS  Neurosurgery PGY-2

## 2018-07-26 NOTE — PROGRESS NOTES
Status: POD #1 s/p Lumbar 4-5 Decompression And Bilateral Transforaminal Interbody Fusion  VS: VSS on 1L,   Neuros: WDL  intact   GI:Regular diet No BM this shift; Pt. Wants to wait till the morning for Miralax  : voiding is spontaneous and WDL    IV: Both IVs saline locked and   Activity :Pt ambulates to toilet sba x1;  Pain: Tolerated on oxycodone 10 mg and acetaminophen   Skin: Incision to lumbar spine covered with dressing, CDI, no drainage noted.   Social:  left for the evening and will be here in the AM   Plan of care: Continue to monitor and follow current POC

## 2018-07-26 NOTE — PLAN OF CARE
Problem: Patient Care Overview  Goal: Plan of Care/Patient Progress Review  Outcome: No Change  Status: Pt POD #2 s/p Lumbar 4-5 Decompression And Bilateral Transforaminal Interbody Fusion  VS: VSS on 2L, via NC [employer] sleeping.    Neuros: Neuros intact  GI: Tolerating regular diet. Denied nausea. No BM since surgery   : Voiding spontaneously.     IV: PIV SL.   Activity: Not OOB, has dangled. Motivated to get OOB today if pain is well controlled.   Pain: Pain moderately managed with scheduled Flexeril and Lidocaine patches, along with PRN Oxycodone.    Skin: Incision to lumbar spine covered with dressing, CDI, no drainage noted. .   Social:  will be here later this AM.   Plan of care: Continue to monitor and follow current POC.

## 2018-07-26 NOTE — PLAN OF CARE
Problem: Patient Care Overview  Goal: Plan of Care/Patient Progress Review  Discharge Planner OT   Patient plan for discharge: home with assist from    Current status: Pt ambulated into bathroom with close CGA and no AE, completed toilet transfer and was able to manage pericares with SBA and grab bars. Pt was transferred back up to rehab gym as above, completed 2x tub transfer with CGA and appropriate use of AE. Steady on feet with no overt LOB noted. Pt was then educated on use of AE for LE dressing after inability to perform figure 4 technique. Pt was successfully able to doff and don socks within precautions after Th provided demo on how to use reacher and sock aid. Pt demos full understanding, was dependently transferred back to room, completed 2nd toilet transfer with SBA, steady with transfers within precautions.   Barriers to return to prior living situation: medical status, post-surgical precautions, fatigue  Recommendations for discharge: home with assist from    Rationale for recommendations: Assist as needed for heavier IADLs to maintain spinal precautions       Entered by: Kenia Dolan 07/26/2018 5:06 PM

## 2018-07-26 NOTE — PROGRESS NOTES
Care Coordinator - Discharge Planning    Admission Date/Time:  7/24/2018  Attending MD:  Fartun Ch*     Data  Chart reviewed, discussed with interdisciplinary team.   Patient was admitted for:   1. S/P lumbar fusion         Assessment   Pt medically stable for discharge to home today. I have met with Pt and her , Dawna to assist with discharge planning. Pt evaluated by Therapy, Pt may benefit from Home PT. Home Care Physical Therapy discussed which Pt declines stating her  will be home with her for 2 weeks and they are going to their cabin.          Plan  Anticipated Discharge Date:  7/26/18  Anticipated Discharge Plan:  Discharge to home with assist from        Sirisha A. Nelson, RN   6B care coordinator # [phone]

## 2018-07-26 NOTE — DISCHARGE SUMMARY
[facility] Discharge Summary and Instructions    Kate Hartman MRN# [id number]   Age: 62 year old YOB: 1955     Date of Admission:  7/24/2018  Date of Discharge::  7/26/2018  Admitting Physician:  Fartun Ch MD  Discharge Physician:  Fartun Ch MD          Admission Diagnoses:   S/P lumbar fusion [Z98.1]          Discharge Diagnosis:   S/P lumbar fusion [Z98.1]          Procedures:   7/24/2018  1.  Stealth image-guided L4 to L5 posterior instrumented fusion, modifier 22 for twice normal procedural length due to difficult anatomy with triangulated L5 vertebral body and severely hypertrophic facets.   2.  Insertion of bilateral intervertebral fusion device, L4 to L5, with local harvest autograft.   3.  Bilateral Rodriguez-Cespedes osteotomies, L4 to L5, for kyphosis deformity correction with decompressive bilateral foraminotomies and laminectomies  4.  Bilateral L4 to L5 posterior lateral fusion with local harvest autograft and allograft.           Brief History of Illness:   Kate Hartman is a very pleasant 62-year-old woman who presented with low back pain and right leg pain and was found to have a grade I, L4 to L5 spondylolisthesis with severe lateral recess and foraminal stenosis, refractory to conservative management.  She had a pelvic incidence of 63 degrees and a lumbar lordosis of 45 degrees for a mismatch of 18 degrees.  She also had a synovial cyst on the right at L4 to L5 causing lateral recess compression.  The patient had a non-symptomatic cervical stenosis, for which we planned to use neural monitoring to ensure no worsening with prone positioning.  After a full discussion of risks versus benefits, she elected to proceed with surgery for L4 to L5 posterior instrumented fusion with interbody fusion and Rodriguez-Cespedes osteotomies for correction of kyphosis at the L4 to L5 level.            Hospital Course:   Following surgery the patient was  transferred to the general neurosurgical floor where she remained medically and neurologically stable.  The patient endorsed incisional pain however stated her pre-operative right leg pain had resolved.  The patient was voiding without difficulty.  The patient was evaluated by therapies who felt she was safe to discharge home with family and home health PT.    Post-operative x-rays showed proper positioning of surgical hardware.  Surgical drain was removed on post operative day #2, tip was intact upon removal.    The patient was tolerating diet, pain was controlled on oral analgesia, she was ambulating with assistance of walker, and she was passing bowel movements prior to discharge.     Patient discharged home with family post operative day #2.           Discharge Medications:     Current Discharge Medication List      START taking these medications    Details   cyclobenzaprine (FLEXERIL) 10 MG tablet Take 1 tablet (10 mg) by mouth 3 times daily  Qty: 45 tablet, Refills: 0    Associated Diagnoses: S/P lumbar fusion      oxyCODONE IR (ROXICODONE) 5 MG tablet Take 1-2 tablets (5-10 mg) by mouth every 3 hours as needed for other (pain control or improvement in physical function. Hold dose for analgesic side effects.)  Qty: 75 tablet, Refills: 0    Associated Diagnoses: S/P lumbar fusion      polyethylene glycol (MIRALAX/GLYCOLAX) Packet Take 17 g by mouth 3 times daily  Qty: 30 packet, Refills: 0    Associated Diagnoses: S/P lumbar fusion      senna-docusate (SENOKOT-S;PERICOLACE) 8.6-50 MG per tablet Take 3 tablets by mouth 2 times daily  Qty: 100 tablet, Refills: 0    Associated Diagnoses: S/P lumbar fusion         CONTINUE these medications which have NOT CHANGED    Details   Acetaminophen (TYLENOL PO) Take 1,000 mg by mouth every 6 hours as needed for mild pain or fever      hydrochlorothiazide (HYDRODIURIL) 25 MG tablet Take 25 mg by mouth every morning       losartan (COZAAR) 50 MG tablet Take 50 mg by mouth  "every morning       metoprolol tartrate (LOPRESSOR) 50 MG tablet Take 50 mg by mouth 2 times daily      multivitamin, therapeutic with minerals (MULTI-VITAMIN) TABS tablet Take 1 tablet by mouth every morning      potassium aminobenzoate 500 MG CAPS capsule Take 1 capsule by mouth 2 times daily      VITAMIN D, CHOLECALCIFEROL, PO Take 1,000 Units by mouth every morning            Exam:   Physical Exam  BP 91/56 (BP Location: Right arm)  Pulse 65  Temp 96.8  F (36  C) (Oral)  Resp 16  Ht 1.549 m (5' 1\")  Wt 67.4 kg (148 lb 9.4 oz)  SpO2 98%  BMI 28.08 kg/m2  General: Appears comfortable, NAD  Wound: Incision, clean, dry, intact without strikethrough  Neurologic Exam:  - AOx3.  - Follows commands.  - Speech fluent, spontaneous. No aphasia or dysarthria.  - No gaze preference. No apparent hemineglect.  - PERRL, EOMI.  - Face symmetric with sensation intact to light touch.  - Palate elevates symmetrically, uvula midline, tongue protrudes midline.  - Trapezii and sternocleidomastoid muscles 5/5 bilaterally.  - No pronator drift.  Motor: Normal bulk/tone; no tremor, rigidity, or bradykinesia.   Right:  Deltoid 5/5, tricep 5/5, bicep 5/5, wrist flexor 5/5, wrist extensor 5/5, finger intrinsic 5/5  Left:  Deltoid 5/5, tricep 5/5, bicep 5/5, wrist flexor 5/5, wrist extensor 5/5, finger intrinsic 5/5  Right: Iliopsoas 5/5, quadricep 5/5, hamstring 5/5, tibialis anterior 5/5, gastrocnemius 5/5, EHL 5/5  Left:  Iliopsoas 5/5, quadricep 5/5, hamstring 5/5, tibialis anterior 5/5, gastrocnemius 5/5, EHL 5/5    Sensation intact in bilateral L4-S1 dermatones                Discharge Instructions and Follow-Up:   Discharge diet: Regular   Discharge activity: You may advance activity as tolerated. No strenuous exercise or heay lifting greater than 10 lbs for 4 weeks or until seen and cleared in clinic.   Discharge follow-up: Follow-up with oJya Carvalho PA-C in 2 weeks   Wound care: Ok to shower,however no scrubbing of the " wound and no soaking of the wound, meaning no bathtubs or swimming pools. Pat dry only. Leave wound open to air.  Sutures are not absorbable and need to be removed in 2 weeks. If patient still at rehab by this time, the sutures may be removed by the rehab physician if he or she considers that the wound has healed completely.     Please call if you have:  1. increased pain, redness, drainage, swelling at your incision  2. fevers > 101.5 F degrees  3. with any questions or concerns.  You may reach the Neurosurgery clinic at [phone] during regular work hours. ER at [phone].    and ask for the Neurosurgery Resident on call at [phone], during off hours or weekends.         Discharge Disposition:   Discharged to home        YNAIQUE Loving, CNP  Department of Neurosurgery  Pager: [phone]

## 2018-07-26 NOTE — PLAN OF CARE
Problem: Patient Care Overview  Goal: Plan of Care/Patient Progress Review  Outcome: Adequate for Discharge Date Met: 07/26/18  Pt is adequate for discharge. Discharge instructions covered w/pt and pt spouse. Both verbalized and demonstrated understandings of instructions and teachings. PIV's removed. Pt voiding on own, 2 BM's today, pain controlled w/ oral medications. Pt spouse driving pt home and picking up medications.

## 2018-07-26 NOTE — PLAN OF CARE
Problem: Patient Care Overview  Goal: Plan of Care/Patient Progress Review  Discharge Planner PT   Patient plan for discharge: Home with  assist  Current status: Performs mobility/transfers indep. Negotiated 15 stairs with SBA/railing. Only mild pain increase and no other issues.  Barriers to return to prior living situation: None  Recommendations for discharge: Home with  assist PRN  Rationale for recommendations: Current level of function       Entered by: Gonzalo Sewell 07/26/2018 5:18 PM

## 2018-07-27 NOTE — PLAN OF CARE
Problem: Patient Care Overview  Goal: Plan of Care/Patient Progress Review  Occupational Therapy Discharge Summary    Reason for therapy discharge:    Discharged to home.    Progress towards therapy goal(s). See goals on Care Plan in [facility] electronic health record for goal details.  Goals met    Therapy recommendation(s):    No further therapy is recommended.  REC home with assist from  for heavier IADLs to maintain spinal precautions.

## 2018-07-30 ENCOUNTER — TELEPHONE (OUTPATIENT)
Dept: NEUROSURGERY | Facility: CLINIC | Age: 63
End: 2018-07-30

## 2018-07-30 NOTE — TELEPHONE ENCOUNTER
Neurosurgery Discharge Follow-Up      Responsible Attending Physician:   Date of Discharge:    Discharge to:  Home    Current Status:  Pt is a 61 y/o woman s/p 1.  Stealth image-guided L4 to L5 posterior instrumented fusion, modifier 22 for twice normal procedural length due to difficult anatomy with triangulated L5 vertebral body and severely hypertrophic facets.   2.  Insertion of bilateral intervertebral fusion device, L4 to L5, with local harvest autograft.   3.  Bilateral Rodriguez-Cespedes osteotomies, L4 to L5, for kyphosis deformity correction with decompressive bilateral foraminotomies and laminectomies  4.  Bilateral L4 to L5 posterior lateral fusion with local harvest autograft and allograft.    on 7/24/2018.  Reports pre-op symptoms improved.   Operative  pain is decreasing.  Ambulating without assistance.  Pain well controlled with current meds, ample supply.  Reports incision CDI without signs of infection.  Denies redness, swelling, increased tenderness, or elevated temp.  Denies current bowel or bladder issues.  No visible sutures/staples in place.      Discharge instructions and medication use were reviewed.  RN triage/on call number given:  [phone] or after hours and w/e - [phone].  Patient verbalized understanding and agreement with current plan.    Follow up appointments/imaging/tests needed:

## 2018-08-07 ENCOUNTER — OFFICE VISIT (OUTPATIENT)
Dept: NEUROSURGERY | Facility: CLINIC | Age: 63
End: 2018-08-07
Payer: COMMERCIAL

## 2018-08-07 VITALS
HEIGHT: 61 IN | DIASTOLIC BLOOD PRESSURE: 80 MMHG | BODY MASS INDEX: 27.56 KG/M2 | TEMPERATURE: 98 F | HEART RATE: 59 BPM | WEIGHT: 146 LBS | SYSTOLIC BLOOD PRESSURE: 133 MMHG

## 2018-08-07 DIAGNOSIS — Z98.1 S/P LUMBAR FUSION: ICD-10-CM

## 2018-08-07 DIAGNOSIS — M71.38 SYNOVIAL CYST OF LUMBAR FACET JOINT: ICD-10-CM

## 2018-08-07 DIAGNOSIS — E55.9 VITAMIN D DEFICIENCY: ICD-10-CM

## 2018-08-07 DIAGNOSIS — Z98.890 POST-OPERATIVE STATE: ICD-10-CM

## 2018-08-07 DIAGNOSIS — M43.16 SPONDYLOLISTHESIS, LUMBAR REGION: Primary | ICD-10-CM

## 2018-08-07 RX ORDER — CYCLOBENZAPRINE HCL 10 MG
10 TABLET ORAL 3 TIMES DAILY
Qty: 45 TABLET | Refills: 0 | Status: SHIPPED | OUTPATIENT
Start: 2018-08-07

## 2018-08-07 RX ORDER — POLYETHYLENE GLYCOL 3350 17 G/17G
POWDER, FOR SOLUTION ORAL
COMMUNITY
Start: 2018-07-26

## 2018-08-07 RX ORDER — CYCLOBENZAPRINE HCL 10 MG
10 TABLET ORAL 3 TIMES DAILY
Qty: 45 TABLET | Refills: 0 | Status: SHIPPED | OUTPATIENT
Start: 2018-08-07 | End: 2018-08-07

## 2018-08-07 RX ORDER — POTASSIUM CHLORIDE 750 MG/1
TABLET, FILM COATED, EXTENDED RELEASE ORAL
COMMUNITY
Start: 2018-07-16

## 2018-08-07 ASSESSMENT — PAIN SCALES - GENERAL: PAINLEVEL: MODERATE PAIN (4)

## 2018-08-07 NOTE — MR AVS SNAPSHOT
After Visit Summary   8/7/2018    Kate Hartman    MRN: [id number]           Patient Information     Date Of Birth          1955        Visit Information        Provider Department      8/7/2018 1:30 PM Joya Carvalho PA-C [facility]        Today's Diagnoses     Spondylolisthesis, lumbar region    -  1    Vitamin D deficiency        Synovial cyst of lumbar facet joint        Post-operative state        S/P lumbar fusion           Follow-ups after your visit        Your next 10 appointments already scheduled     Aug 31, 2018 10:30 AM CDT   LAB with  LAB   [facility])    [address] [place] [id number] [phone]           Please do not eat 10-12 hours before your appointment if you are coming in fasting for labs on lipids, cholesterol, or glucose (sugar). This does not apply to pregnant women. Water, hot tea and black coffee (with nothing added) are okay. Do not drink other fluids, diet soda or chew gum.            Sep 05, 2018 10:30 AM CDT   (Arrive by 10:15 AM)   Return Visit with Joya Carvalho PA-C   [facility] ([facility])    [address] [place] [id number] [phone]            Oct 25, 2018 10:30 AM CDT   (Arrive by 10:15 AM)   Return Visit with Fartun Ch MD   [facility] ([facility])    [address] [place] [id number] [phone]              Future tests that were ordered for you today     Open Future Orders        Priority Expected Expires Ordered    25 Hydroxyvitamin D2 and D3 Routine  8/8/2019 8/7/2018            Who to contact     Please call your clinic at [phone] to:    Ask questions about your health    Make or cancel appointments    Discuss your medicines    Learn about your test results    Speak to your doctor            Additional  "Information About Your Visit        [employer] Information     [employer] gives you secure access to your electronic health record. If you see a primary care provider, you can also send messages to your care team and make appointments. If you have questions, please call your primary care clinic.  If you do not have a primary care provider, please call [phone] and they will assist you.      [employer] is an electronic gateway that provides easy, online access to your medical records. With [employer], you can request a clinic appointment, read your test results, renew a prescription or communicate with your care team.     To access your existing account, please contact your [facility] or call [phone] for assistance.        Care EveryWhere ID     This is your Care EveryWhere ID. This could be used by other organizations to access your [place] medical records  [id number]        Your Vitals Were     Pulse Temperature Height BMI (Body Mass Index)          59 98  F (36.7  C) (Oral) 1.549 m (5' 1\") 27.59 kg/m2         Blood Pressure from Last 3 Encounters:   08/07/18 133/80   07/26/18 91/56   07/16/18 (!) 164/103    Weight from Last 3 Encounters:   08/07/18 66.2 kg (146 lb)   07/24/18 67.4 kg (148 lb 9.4 oz)   07/16/18 67.2 kg (148 lb 1.6 oz)                 Today's Medication Changes          These changes are accurate as of 8/7/18  2:40 PM.  If you have any questions, ask your nurse or doctor.               Start taking these medicines.        Dose/Directions    cyclobenzaprine 10 MG tablet   Commonly known as:  FLEXERIL   Used for:  S/P lumbar fusion   Started by:  Joya Carvalho PA-C        Dose:  10 mg   Take 1 tablet (10 mg) by mouth 3 times daily   Quantity:  45 tablet   Refills:  0            Where to get your medicines      These medications were sent to [place] [address] [place] [address]     Phone:  [phone]     " cyclobenzaprine 10 MG tablet                Primary Care Provider Office Phone # Fax #    Brianna Ghada Guevara [phone]       [place] [facility] [address]  [facility] [address]        Equal Access to Services     BRIEN BOSS : Azalea gurvinder summers carolinao Sowinifredali, waaxda luqadaha, qaybta kaalmada adeegyada, waxnash petersn esmer rogers carlito jimenez. So [facility] [phone].    ATENCIÓN: Si habla español, tiene a burnett disposición servicios gratuitos de asistencia lingüística. Llame al [phone].    We comply with applicable federal civil rights laws and [place] laws. We do not discriminate on the basis of race, color, national origin, age, disability, sex, sexual orientation, or gender identity.            Thank you!     Thank you for choosing [facility]  for your care. Our goal is always to provide you with excellent care. Hearing back from our patients is one way we can continue to improve our services. Please take a few minutes to complete the written survey that you may receive in the mail after your visit with us. Thank you!             Your Updated Medication List - Protect others around you: Learn how to safely use, store and throw away your medicines at www.disposemymeds.org.          This list is accurate as of 8/7/18  2:40 PM.  Always use your most recent med list.                   Brand Name Dispense Instructions for use Diagnosis    cyclobenzaprine 10 MG tablet    FLEXERIL    45 tablet    Take 1 tablet (10 mg) by mouth 3 times daily    S/P lumbar fusion       hydrochlorothiazide 25 MG tablet    HYDRODIURIL     Take 25 mg by mouth every morning        KLOR-CON 10 MEQ tablet   Generic drug:  potassium chloride           losartan 50 MG tablet    COZAAR     Take 50 mg by mouth every morning        metoprolol tartrate 50 MG tablet    LOPRESSOR     Take 50 mg by mouth 2 times daily        Multi-vitamin Tabs tablet      Take 1 tablet by mouth every morning        oxyCODONE IR 5 MG  tablet    ROXICODONE    75 tablet    Take 1-2 tablets (5-10 mg) by mouth every 3 hours as needed for other (pain control or improvement in physical function. Hold dose for analgesic side effects.)    S/P lumbar fusion       * polyethylene glycol Packet    MIRALAX/GLYCOLAX    30 packet    Take 17 g by mouth 3 times daily    S/P lumbar fusion       * polyethylene glycol powder    MIRALAX/GLYCOLAX          potassium aminobenzoate 500 MG Caps capsule      Take 1 capsule by mouth 2 times daily        senna-docusate 8.6-50 MG per tablet    SENOKOT-S;PERICOLACE    100 tablet    Take 3 tablets by mouth 2 times daily    S/P lumbar fusion       TYLENOL PO      Take 1,000 mg by mouth every 6 hours as needed for mild pain or fever        VITAMIN D (CHOLECALCIFEROL) PO      Take 1,000 Units by mouth every morning        * Notice:  This list has 2 medication(s) that are the same as other medications prescribed for you. Read the directions carefully, and ask your doctor or other care provider to review them with you.

## 2018-08-07 NOTE — PROGRESS NOTES
Neurosurgery clinic post op wound check  Date of visit: 8/7/2018      Procedure:    07/24/2018 Dima Dale  DIAGNOSES:     1.  L4 to L5 spondylolisthesis with radiculopathy.   2.  Flat back syndrome with pelvic incidence to lumbar lordosis mismatch.       PROCEDURE PERFORMED:   1.  Stealth image-guided L4 to L5 posterior instrumented fusion, modifier 22 for twice normal procedural length due to difficult anatomy with triangulated L5 vertebral body and severely hypertrophic facets.   2.  Insertion of bilateral intervertebral fusion device, L4 to L5, with local harvest autograft.   3.  Bilateral Rodriguez-Cespedes osteotomies, L4 to L5, for kyphosis deformity correction with decompressive bilateral foraminotomies and laminectomies  4.  Bilateral L4 to L5 posterior lateral fusion with local harvest autograft and allograft.       INDICATIONS FOR SURGERY:  Kate Hartman is a very pleasant 62-year-old woman who presented with low back pain and right leg pain and was found to have a grade I, L4 to L5 spondylolisthesis with severe lateral recess and foraminal stenosis, refractory to conservative management.  She had a pelvic incidence of 63 degrees and a lumbar lordosis of 45 degrees for a mismatch of 18 degrees.  She also had a synovial cyst on the right at L4 to L5 causing lateral recess compression.  The patient had a non-symptomatic cervical stenosis, for which we planned to use neural monitoring to ensure no worsening with prone positioning.  After a full discussion of risks versus benefits, she elected to proceed with surgery for L4 to L5 posterior instrumented fusion with interbody fusion and Rodriguez-Cespedes osteotomies for correction of kyphosis at the L4 to L5 level.    HPI:  Inpatient:   Following surgery the patient was transferred to the general neurosurgical floor where she remained medically and neurologically stable.  The patient endorsed incisional pain however stated her pre-operative right leg pain had  resolved.  The patient was voiding without difficulty.  The patient was evaluated by therapies who felt she was safe to discharge home with family and home health PT.    Post-operative x-rays showed proper positioning of surgical hardware.  Surgical drain was removed on post operative day #2, tip was intact upon removal.    The patient was tolerating diet, pain was controlled on oral analgesia, she was ambulating with assistance of walker, and she was passing bowel movements prior to discharge.     Patient discharged home with family post operative day #2.  Outpatient:   She is now 2 weeks post op.     Since discharge her right leg pain is gone her back pain is under good control with Tylenol and Flexeril.  About 3 days ago she developed some left leg pain in about an L5 pattern but it is fairly tolerable.  She has no neurologic deficits.  She is pleased with her results thus far.  She presents for routine wound check and suture/staple removal.    STATUS REPORT  WORK:         Is not back at work.  Position: Part-time desk work, especially in tax season  ACTIVITY:     Has been following activity restrictions.    MEDS:                  Pain           Tylenol, Flexeril           NSAIDS    :   NICOTINE:   no    25 OH Total:   25 OH Vit D total <52  20 - 75 ug/L Final 05/31/2018  4:10 PM     COLLAR/BRACE PLAN:  NA    Patient Supplied Answers To the [facility] Pain Questionnaire  [facility] Pain -  Patient Entered Questionnaire/Answers 8/1/2018   What number best describes your pain right now:  0 = No pain  to  10 = Worst pain imaginable 4   How would you describe the pain? dull, aching, throbbing   Which of the following worsen your pain? standing, walking, exercise, coughing / sneezing, bowel movements   Which of the following improve or reduce your pain?  lying down, sitting, medication, thinking about something else   What number best describes your average pain for the past week:  0 = No pain  to  10 = Worst pain imaginable 5   What  number best describes your LOWEST pain in past 24 hours:  0 = No pain  to  10 = Worst pain imaginable 2   What number best describes your WORST pain in past 24 hours:  0 = No pain  to  10 = Worst pain imaginable 8   When is your pain worst? Night   What non-medicine treatments have you already had for your pain? none   Have you tried treating your pain with medication?  Yes   Are you currently taking medications for your pain? Yes            Current Outpatient Prescriptions:      Acetaminophen (TYLENOL PO), Take 1,000 mg by mouth every 6 hours as needed for mild pain or fever, Disp: , Rfl:      cyclobenzaprine (FLEXERIL) 10 MG tablet, Take 1 tablet (10 mg) by mouth 3 times daily, Disp: 45 tablet, Rfl: 0     hydrochlorothiazide (HYDRODIURIL) 25 MG tablet, Take 25 mg by mouth every morning , Disp: , Rfl:      losartan (COZAAR) 50 MG tablet, Take 50 mg by mouth every morning , Disp: , Rfl:      metoprolol tartrate (LOPRESSOR) 50 MG tablet, Take 50 mg by mouth 2 times daily, Disp: , Rfl:      multivitamin, therapeutic with minerals (MULTI-VITAMIN) TABS tablet, Take 1 tablet by mouth every morning, Disp: , Rfl:      oxyCODONE IR (ROXICODONE) 5 MG tablet, Take 1-2 tablets (5-10 mg) by mouth every 3 hours as needed for other (pain control or improvement in physical function. Hold dose for analgesic side effects.), Disp: 75 tablet, Rfl: 0     polyethylene glycol (MIRALAX/GLYCOLAX) Packet, Take 17 g by mouth 3 times daily, Disp: 30 packet, Rfl: 0     potassium aminobenzoate 500 MG CAPS capsule, Take 1 capsule by mouth 2 times daily, Disp: , Rfl:      senna-docusate (SENOKOT-S;PERICOLACE) 8.6-50 MG per tablet, Take 3 tablets by mouth 2 times daily, Disp: 100 tablet, Rfl: 0     VITAMIN D, CHOLECALCIFEROL, PO, Take 1,000 Units by mouth every morning, Disp: , Rfl:      KLOR-CON 10 MEQ tablet, , Disp: , Rfl:      polyethylene glycol (MIRALAX/GLYCOLAX) powder, , Disp: , Rfl:   Allergies   Allergen Reactions     Amlodipine  "Anaphylaxis     Lisinopril Cough     PMH, SOC HIST, FAM HIST, PROBLEM LIST:  All reviewed in EPIC.    OBJECTIVE:  /80 (BP Location: Left arm, Cuff Size: Adult Regular)  Pulse 59  Temp 98  F (36.7  C) (Oral)  Ht 1.549 m (5' 1\")  Wt 66.2 kg (146 lb)  BMI 27.59 kg/m2    Imaging:  These are the pertinent findings from:  MRI CT W/WO Xrays taken:   none today 8/7/2018  My impression: Overall alignment of the spine in 2 planes is satisfactory and unchanged from post op. Hardware is in good position without evidence failure.  Radiologist's report:  Please see Epic for the bulk of the report.  I personally reviewed the images with the patient    EXAM:  Well developed well nourished female found seated comfortably in exam chair.  No apparent distress. She is accompanied.  A&O X3.  Mood and affect WNL. Language and fund of knowledge intact.  Is able to sit and rise independently.   She has a nicely healing incision.  I prepped the wound with chloroprep and cleanly removed nylon sutures/staples without difficulty.  .  Exam at discharge:   Right: Iliopsoas 5/5, quadricep 5/5, hamstring 5/5, tibialis anterior 5/5, gastrocnemius 5/5, EHL 5/5  Left:  Iliopsoas 5/5, quadricep 5/5, hamstring 5/5, tibialis anterior 5/5, gastrocnemius 5/5, EHL 5/5  Sensation intact in bilateral L4-S1 dermatomes   Exam today:  No change    Assessment/Plan:  1. Spondylolisthesis, lumbar region    2. Vitamin D deficiency    3. Synovial cyst of lumbar facet joint    4. Post-operative state      Kate Hartman is doing well 2 weeks after her lumbar fusion.  Her right leg pain is gone, she has a little left L5 pain but is tolerating it very well.  She is using her walker, remembering to use it especially if she is feeling a little shaky.  Overall progressing nicely .The wound is healthy and healing well without evidence of infection.    We discussed wound care.  *  Keep it covered for 1 day  *  May shower and shampoo with mild soap/shampoo " including the incision. No hair conditioners, hair treatments or skin cremes for two more weeks.  *  May swim or bathe when all scabbing is gone from the incision.  We discussed medication.    *  FYI: In general we prescribe narcotics for pain management in the post operative recovery phase generally 2-6 weeks post op, depending on the surgery performed.    *  Medications prescribed today: flexeril     *  Continue to avoid NSAIDs until 3 months post op.  We discussed activities and return to work.  *  We recommend she continue the current activity restrictions until she returns for the next visit.  *   She can return to driving if: she is off narcotic  *  She can return to work with these limits:  Not yet.   We discussed follow up   *  Return to clinic in 4 weeks with me. Imaging for that visit: Plain XR.  Dr Ch in 10 weeks.  *  All the patient's questions have been answered and they demonstrate good understanding of the above.    *   The patient has our contact information and is aware that she should call if she has questions comments or concerns.     We appreciate the opportunity to be of service in the care of this pleasant patient.  Please do call if there is anything more we can do    Joya Carvalho PA-C  [facility]  Department of Neurosurgery  Phone: [phone]  Fax: [phone]    This note was generated using voice recognition software. While edited for content some inaccurate phrasing may be found.

## 2018-08-07 NOTE — NURSING NOTE
Chief Complaint   Patient presents with     RECHECK     2 WEEK POST-OP DOS - 7/24/2018     Preventive Care:    Breast Cancer Screening: During our visit today, we discussed that it is recommended you receive breast cancer screening. Please call or make an appointment with your primary care provider to discuss this with them. You may also call the [facility] scheduling line ([phone]) to set up a mammography appointment at the Breast [facility] within the [facility].      Shanon Gomez

## 2018-08-07 NOTE — LETTER
8/7/2018       RE: Kate Hartman  [address]  [place] [address]     Dear Colleague,    Thank you for referring your patient, Kate Hartman, to the [facility] NEUROSURGERY at [facility]. Please see a copy of my visit note below.      Neurosurgery clinic post op wound check  Date of visit: 8/7/2018      Procedure:    07/24/2018 Dima Dale  DIAGNOSES:     1.  L4 to L5 spondylolisthesis with radiculopathy.   2.  Flat back syndrome with pelvic incidence to lumbar lordosis mismatch.       PROCEDURE PERFORMED:   1.  Stealth image-guided L4 to L5 posterior instrumented fusion, modifier 22 for twice normal procedural length due to difficult anatomy with triangulated L5 vertebral body and severely hypertrophic facets.   2.  Insertion of bilateral intervertebral fusion device, L4 to L5, with local harvest autograft.   3.  Bilateral Rodriguez-Cespedes osteotomies, L4 to L5, for kyphosis deformity correction with decompressive bilateral foraminotomies and laminectomies  4.  Bilateral L4 to L5 posterior lateral fusion with local harvest autograft and allograft.       INDICATIONS FOR SURGERY:  Kate Hartman is a very pleasant 62-year-old woman who presented with low back pain and right leg pain and was found to have a grade I, L4 to L5 spondylolisthesis with severe lateral recess and foraminal stenosis, refractory to conservative management.  She had a pelvic incidence of 63 degrees and a lumbar lordosis of 45 degrees for a mismatch of 18 degrees.  She also had a synovial cyst on the right at L4 to L5 causing lateral recess compression.  The patient had a non-symptomatic cervical stenosis, for which we planned to use neural monitoring to ensure no worsening with prone positioning.  After a full discussion of risks versus benefits, she elected to proceed with surgery for L4 to L5 posterior instrumented fusion with interbody fusion and Rodriguez-Cespedes osteotomies for correction of  kyphosis at the L4 to L5 level.    HPI:  Inpatient:   Following surgery the patient was transferred to the general neurosurgical floor where she remained medically and neurologically stable.  The patient endorsed incisional pain however stated her pre-operative right leg pain had resolved.  The patient was voiding without difficulty.  The patient was evaluated by therapies who felt she was safe to discharge home with family and home health PT.    Post-operative x-rays showed proper positioning of surgical hardware.  Surgical drain was removed on post operative day #2, tip was intact upon removal.    The patient was tolerating diet, pain was controlled on oral analgesia, she was ambulating with assistance of walker, and she was passing bowel movements prior to discharge.     Patient discharged home with family post operative day #2.  Outpatient:   She is now 2 weeks post op.     Since discharge her right leg pain is gone her back pain is under good control with Tylenol and Flexeril.  About 3 days ago she developed some left leg pain in about an L5 pattern but it is fairly tolerable.  She has no neurologic deficits.  She is pleased with her results thus far.  She presents for routine wound check and suture/staple removal.    STATUS REPORT  WORK:         Is not back at work.  Position: Part-time desk work, especially in tax season  ACTIVITY:     Has been following activity restrictions.    MEDS:                  Pain           Tylenol, Flexeril           NSAIDS    :   NICOTINE:   no    25 OH Total:   25 OH Vit D total <52  20 - 75 ug/L Final 05/31/2018  4:10 PM     COLLAR/BRACE PLAN:  NA    Patient Supplied Answers To the [facility] Pain Questionnaire  [facility] Pain -  Patient Entered Questionnaire/Answers 8/1/2018   What number best describes your pain right now:  0 = No pain  to  10 = Worst pain imaginable 4   How would you describe the pain? dull, aching, throbbing   Which of the following worsen your pain? standing, walking,  exercise, coughing / sneezing, bowel movements   Which of the following improve or reduce your pain?  lying down, sitting, medication, thinking about something else   What number best describes your average pain for the past week:  0 = No pain  to  10 = Worst pain imaginable 5   What number best describes your LOWEST pain in past 24 hours:  0 = No pain  to  10 = Worst pain imaginable 2   What number best describes your WORST pain in past 24 hours:  0 = No pain  to  10 = Worst pain imaginable 8   When is your pain worst? Night   What non-medicine treatments have you already had for your pain? none   Have you tried treating your pain with medication?  Yes   Are you currently taking medications for your pain? Yes            Current Outpatient Prescriptions:      Acetaminophen (TYLENOL PO), Take 1,000 mg by mouth every 6 hours as needed for mild pain or fever, Disp: , Rfl:      cyclobenzaprine (FLEXERIL) 10 MG tablet, Take 1 tablet (10 mg) by mouth 3 times daily, Disp: 45 tablet, Rfl: 0     hydrochlorothiazide (HYDRODIURIL) 25 MG tablet, Take 25 mg by mouth every morning , Disp: , Rfl:      losartan (COZAAR) 50 MG tablet, Take 50 mg by mouth every morning , Disp: , Rfl:      metoprolol tartrate (LOPRESSOR) 50 MG tablet, Take 50 mg by mouth 2 times daily, Disp: , Rfl:      multivitamin, therapeutic with minerals (MULTI-VITAMIN) TABS tablet, Take 1 tablet by mouth every morning, Disp: , Rfl:      oxyCODONE IR (ROXICODONE) 5 MG tablet, Take 1-2 tablets (5-10 mg) by mouth every 3 hours as needed for other (pain control or improvement in physical function. Hold dose for analgesic side effects.), Disp: 75 tablet, Rfl: 0     polyethylene glycol (MIRALAX/GLYCOLAX) Packet, Take 17 g by mouth 3 times daily, Disp: 30 packet, Rfl: 0     potassium aminobenzoate 500 MG CAPS capsule, Take 1 capsule by mouth 2 times daily, Disp: , Rfl:      senna-docusate (SENOKOT-S;PERICOLACE) 8.6-50 MG per tablet, Take 3 tablets by mouth 2 times  "daily, Disp: 100 tablet, Rfl: 0     VITAMIN D, CHOLECALCIFEROL, PO, Take 1,000 Units by mouth every morning, Disp: , Rfl:      KLOR-CON 10 MEQ tablet, , Disp: , Rfl:      polyethylene glycol (MIRALAX/GLYCOLAX) powder, , Disp: , Rfl:   Allergies   Allergen Reactions     Amlodipine Anaphylaxis     Lisinopril Cough     PMH, SOC HIST, FAM HIST, PROBLEM LIST:  All reviewed in EPIC.    OBJECTIVE:  /80 (BP Location: Left arm, Cuff Size: Adult Regular)  Pulse 59  Temp 98  F (36.7  C) (Oral)  Ht 1.549 m (5' 1\")  Wt 66.2 kg (146 lb)  BMI 27.59 kg/m2    Imaging:  These are the pertinent findings from:  MRI CT W/WO Xrays taken:   none today 8/7/2018  My impression: Overall alignment of the spine in 2 planes is satisfactory and unchanged from post op. Hardware is in good position without evidence failure.  Radiologist's report:  Please see Epic for the bulk of the report.  I personally reviewed the images with the patient    EXAM:  Well developed well nourished female found seated comfortably in exam chair.  No apparent distress. She is accompanied.  A&O X3.  Mood and affect WNL. Language and fund of knowledge intact.  Is able to sit and rise independently.   She has a nicely healing incision.  I prepped the wound with chloroprep and cleanly removed nylon sutures/staples without difficulty.  .  Exam at discharge:   Right: Iliopsoas 5/5, quadricep 5/5, hamstring 5/5, tibialis anterior 5/5, gastrocnemius 5/5, EHL 5/5  Left:  Iliopsoas 5/5, quadricep 5/5, hamstring 5/5, tibialis anterior 5/5, gastrocnemius 5/5, EHL 5/5  Sensation intact in bilateral L4-S1 dermatomes   Exam today:  No change    Assessment/Plan:  1. Spondylolisthesis, lumbar region    2. Vitamin D deficiency    3. Synovial cyst of lumbar facet joint    4. Post-operative state      Kate Hartman is doing well 2 weeks after her lumbar fusion.  Her right leg pain is gone, she has a little left L5 pain but is tolerating it very well.  She is using her " walker, remembering to use it especially if she is feeling a little shaky.  Overall progressing nicely .The wound is healthy and healing well without evidence of infection.    We discussed wound care.  *  Keep it covered for 1 day  *  May shower and shampoo with mild soap/shampoo including the incision. No hair conditioners, hair treatments or skin cremes for two more weeks.  *  May swim or bathe when all scabbing is gone from the incision.  We discussed medication.    *  FYI: In general we prescribe narcotics for pain management in the post operative recovery phase generally 2-6 weeks post op, depending on the surgery performed.    *  Medications prescribed today: flexeril     *  Continue to avoid NSAIDs until 3 months post op.  We discussed activities and return to work.  *  We recommend she continue the current activity restrictions until she returns for the next visit.  *   She can return to driving if: she is off narcotic  *  She can return to work with these limits:  Not yet.   We discussed follow up   *  Return to clinic in 4 weeks with me. Imaging for that visit: Plain XR.  Dr Ch in 10 weeks.  *  All the patient's questions have been answered and they demonstrate good understanding of the above.    *   The patient has our contact information and is aware that she should call if she has questions comments or concerns.     We appreciate the opportunity to be of service in the care of this pleasant patient.  Please do call if there is anything more we can do      This note was generated using voice recognition software. While edited for content some inaccurate phrasing may be found.    Again, thank you for allowing me to participate in the care of your patient.      Sincerely,    Joya Carvalho PA-C

## 2018-08-31 ENCOUNTER — RADIANT APPOINTMENT (OUTPATIENT)
Dept: GENERAL RADIOLOGY | Facility: CLINIC | Age: 63
End: 2018-08-31
Attending: PHYSICIAN ASSISTANT
Payer: COMMERCIAL

## 2018-08-31 DIAGNOSIS — Z98.890 POST-OPERATIVE STATE: ICD-10-CM

## 2018-08-31 DIAGNOSIS — M71.38 SYNOVIAL CYST OF LUMBAR FACET JOINT: ICD-10-CM

## 2018-08-31 DIAGNOSIS — Z98.1 S/P LUMBAR FUSION: ICD-10-CM

## 2018-08-31 DIAGNOSIS — E55.9 VITAMIN D DEFICIENCY: ICD-10-CM

## 2018-08-31 DIAGNOSIS — M43.16 SPONDYLOLISTHESIS, LUMBAR REGION: ICD-10-CM

## 2018-09-04 LAB
DEPRECATED CALCIDIOL+CALCIFEROL SERPL-MC: <69 UG/L (ref 20–75)
VITAMIN D2 SERPL-MCNC: <5 UG/L
VITAMIN D3 SERPL-MCNC: 64 UG/L

## 2018-09-05 ENCOUNTER — OFFICE VISIT (OUTPATIENT)
Dept: NEUROSURGERY | Facility: CLINIC | Age: 63
End: 2018-09-05
Payer: COMMERCIAL

## 2018-09-05 VITALS
OXYGEN SATURATION: 98 % | SYSTOLIC BLOOD PRESSURE: 144 MMHG | HEIGHT: 61 IN | WEIGHT: 143.1 LBS | TEMPERATURE: 98.2 F | DIASTOLIC BLOOD PRESSURE: 90 MMHG | BODY MASS INDEX: 27.02 KG/M2 | HEART RATE: 129 BPM

## 2018-09-05 DIAGNOSIS — M43.16 SPONDYLOLISTHESIS, LUMBAR REGION: Primary | ICD-10-CM

## 2018-09-05 DIAGNOSIS — Z98.890 POST-OPERATIVE STATE: ICD-10-CM

## 2018-09-05 DIAGNOSIS — E55.9 VITAMIN D DEFICIENCY: ICD-10-CM

## 2018-09-05 ASSESSMENT — PAIN SCALES - GENERAL: PAINLEVEL: MILD PAIN (2)

## 2018-09-05 NOTE — NURSING NOTE
Chief Complaint   Patient presents with     RECHECK     [facility] RETURN POST SURGERY     Preventive Care:    Breast Cancer Screening: During our visit today, we discussed that it is recommended you receive breast cancer screening. Please call or make an appointment with your primary care provider to discuss this with them. You may also call the [facility] scheduling line ([phone]) to set up a mammography appointment at the [facility] within the [facility].      Rhoda Parker, EMT

## 2018-09-05 NOTE — MR AVS SNAPSHOT
After Visit Summary   9/5/2018    Kate Hartman    MRN: [id number]           Patient Information     Date Of Birth          1955        Visit Information        Provider Department      9/5/2018 10:30 AM Joya Carvalho PA-C [facility] Neurosurgery        Today's Diagnoses     Spondylolisthesis, lumbar region    -  1    Vitamin D deficiency        Post-operative state           Follow-ups after your visit        Your next 10 appointments already scheduled     Oct 25, 2018 10:15 AM CDT   XR LUMBAR SPINE 2/3 VIEWS with UCXR1   [facility] ([facility])    [address] [address] [phone]           Please bring a list of your current medicines to your exam. (Include vitamins, minerals and over-thecounter medicines.) Leave your valuables at home.  Tell your doctor if there is a chance you may be pregnant.  You do not need to do anything special for this exam.            Oct 25, 2018 10:30 AM CDT   (Arrive by 10:15 AM)   Return Visit with Fartun Ch MD   [facility] ([facility])    [address] [place] [address] [phone]              Future tests that were ordered for you today     Open Future Orders        Priority Expected Expires Ordered    X-ray Lumbar spine 2-3 views (AP and lateral - standing views preferred) [IMG66] Routine 9/5/2018 9/5/2019 9/5/2018            Who to contact     Please call your clinic at [phone] to:    Ask questions about your health    Make or cancel appointments    Discuss your medicines    Learn about your test results    Speak to your doctor            Additional Information About Your Visit        MyChart Information     [employer] gives you secure access to your electronic health record. If you see a primary care provider, you can also send messages to your care team and make appointments. If you have  "questions, please call your primary care clinic.  If you do not have a primary care provider, please call [phone] and they will assist you.      [employer] is an electronic gateway that provides easy, online access to your medical records. With [employer], you can request a clinic appointment, read your test results, renew a prescription or communicate with your care team.     To access your existing account, please contact your [facility] or call [phone] for assistance.        Care EveryWhere ID     This is your Care EveryWhere ID. This could be used by other organizations to access your [place] medical records  [id number]        Your Vitals Were     Pulse Temperature Height Pulse Oximetry Breastfeeding? BMI (Body Mass Index)    129 98.2  F (36.8  C) (Oral) 1.554 m (5' 1.2\") 98% No 26.86 kg/m2       Blood Pressure from Last 3 Encounters:   09/05/18 144/90   08/07/18 133/80   07/26/18 91/56    Weight from Last 3 Encounters:   09/05/18 64.9 kg (143 lb 1.6 oz)   08/07/18 66.2 kg (146 lb)   07/24/18 67.4 kg (148 lb 9.4 oz)               Primary Care Provider Office Phone # Fax #    Brianna Ghada Guevara [phone]       [place] [facility] [address]        Equal Access to Services     [facility]: Hadii aad ku hadasho Soomaali, waaxda luqadaha, qaybta kaalmada sean, sol esteban . So [facility] [phone].    ATENCIÓN: Si habla español, tiene a burnett disposición servicios gratuitos de asistencia lingüística. Llame al [phone].    We comply with applicable federal civil rights laws and [place] laws. We do not discriminate on the basis of race, color, national origin, age, disability, sex, sexual orientation, or gender identity.            Thank you!     Thank you for choosing [facility]  for your care. Our goal is always to provide you with excellent care. Hearing back from our patients is one way we " can continue to improve our services. Please take a few minutes to complete the written survey that you may receive in the mail after your visit with us. Thank you!             Your Updated Medication List - Protect others around you: Learn how to safely use, store and throw away your medicines at www.disposemymeds.org.          This list is accurate as of 9/5/18 11:11 AM.  Always use your most recent med list.                   Brand Name Dispense Instructions for use Diagnosis    cyclobenzaprine 10 MG tablet    FLEXERIL    45 tablet    Take 1 tablet (10 mg) by mouth 3 times daily    S/P lumbar fusion       hydrochlorothiazide 25 MG tablet    HYDRODIURIL     Take 25 mg by mouth every morning        KLOR-CON 10 MEQ tablet   Generic drug:  potassium chloride           losartan 50 MG tablet    COZAAR     Take 50 mg by mouth every morning        metoprolol tartrate 50 MG tablet    LOPRESSOR     Take 50 mg by mouth 2 times daily        Multi-vitamin Tabs tablet      Take 1 tablet by mouth every morning        oxyCODONE IR 5 MG tablet    ROXICODONE    75 tablet    Take 1-2 tablets (5-10 mg) by mouth every 3 hours as needed for other (pain control or improvement in physical function. Hold dose for analgesic side effects.)    S/P lumbar fusion       * polyethylene glycol Packet    MIRALAX/GLYCOLAX    30 packet    Take 17 g by mouth 3 times daily    S/P lumbar fusion       * polyethylene glycol powder    MIRALAX/GLYCOLAX          potassium aminobenzoate 500 MG Caps capsule      Take 1 capsule by mouth 2 times daily        senna-docusate 8.6-50 MG per tablet    SENOKOT-S;PERICOLACE    100 tablet    Take 3 tablets by mouth 2 times daily    S/P lumbar fusion       TYLENOL PO      Take 1,000 mg by mouth every 6 hours as needed for mild pain or fever        VITAMIN D (CHOLECALCIFEROL) PO      Take 1,000 Units by mouth every morning        * Notice:  This list has 2 medication(s) that are the same as other medications prescribed  for you. Read the directions carefully, and ask your doctor or other care provider to review them with you.

## 2018-09-05 NOTE — PROGRESS NOTES
Neurosurgery clinic post op  Date of visit: 9/5/2018      Procedure:    07/24/2018 Dima Dale  DIAGNOSES:     1.  L4 to L5 spondylolisthesis with radiculopathy.   2.  Flat back syndrome with pelvic incidence to lumbar lordosis mismatch.       PROCEDURE PERFORMED:   1.  Stealth image-guided L4 to L5 posterior instrumented fusion, modifier 22 for twice normal procedural length due to difficult anatomy with triangulated L5 vertebral body and severely hypertrophic facets.   2.  Insertion of bilateral intervertebral fusion device, L4 to L5, with local harvest autograft.   3.  Bilateral Rodriguez-Cespedes osteotomies, L4 to L5, for kyphosis deformity correction with decompressive bilateral foraminotomies and laminectomies  4.  Bilateral L4 to L5 posterior lateral fusion with local harvest autograft and allograft.       INDICATIONS FOR SURGERY:  Kate Hartman is a very pleasant 62-year-old woman who presented with low back pain and right leg pain and was found to have a grade I, L4 to L5 spondylolisthesis with severe lateral recess and foraminal stenosis, refractory to conservative management.  She had a pelvic incidence of 63 degrees and a lumbar lordosis of 45 degrees for a mismatch of 18 degrees.  She also had a synovial cyst on the right at L4 to L5 causing lateral recess compression.  The patient had a non-symptomatic cervical stenosis, for which we planned to use neural monitoring to ensure no worsening with prone positioning.  After a full discussion of risks versus benefits, she elected to proceed with surgery for L4 to L5 posterior instrumented fusion with interbody fusion and Rodriguez-Cespedes osteotomies for correction of kyphosis at the L4 to L5 level.    HPI:  Inpatient:   Following surgery the patient was transferred to the general neurosurgical floor where she remained medically and neurologically stable.  The patient endorsed incisional pain however stated her pre-operative right leg pain had resolved.  The  patient was voiding without difficulty.  The patient was evaluated by therapies who felt she was safe to discharge home with family and home health PT.    Post-operative x-rays showed proper positioning of surgical hardware.  Surgical drain was removed on post operative day #2, tip was intact upon removal.    The patient was tolerating diet, pain was controlled on oral analgesia, she was ambulating with assistance of walker, and she was passing bowel movements prior to discharge.     Patient discharged home with family post operative day #2.  Outpatient:   She is now 6 weeks post op.     Since discharge her right leg pain is gone, her back pain is minimal, left leg pain she had last time is gone. Has a little pressure feeling where the hardware is.  She has no neurologic deficits.  She is very pleased with her results thus far.  She has had lumbar spine films taken and vitamin D levels a few days ago.    STATUS REPORT  WORK:         Is not back at work.  Position: Part-time desk work, especially in tax season  ACTIVITY:     Has been following activity restrictions.    MEDS:                  Pain           Tylenol, Flexeril           NSAIDS    no  NICOTINE:   no    25 OH Total:   25 OH Vit D total <52  20 - 75 ug/L  05/31/2018  4:10 PM       25 OH Vit D2 <5   ug/L Final 08/31/2018 10:35 AM 51   25 OH Vit D3 64   ug/L Final 08/31/2018 10:35 AM 51   25 OH Vit D total <69  20 - 75 ug/L Final 08/31/2018 10:35 AM 51   Comment:     COLLAR/BRACE PLAN:  NA    Patient Supplied Answers To the UC Pain Questionnaire  UC Pain -  Patient Entered Questionnaire/Answers 8/24/2018   What number best describes your pain right now:  0 = No pain  to  10 = Worst pain imaginable -   How would you describe the pain? dull, aching   Which of the following worsen your pain? standing, sitting, walking, coughing / sneezing   Which of the following improve or reduce your pain?  lying down, medication, relaxation, thinking about something else    What number best describes your average pain for the past week:  0 = No pain  to  10 = Worst pain imaginable 1   What number best describes your LOWEST pain in past 24 hours:  0 = No pain  to  10 = Worst pain imaginable 0   What number best describes your WORST pain in past 24 hours:  0 = No pain  to  10 = Worst pain imaginable 2   When is your pain worst? PM   What non-medicine treatments have you already had for your pain? exercise, other   Have you tried treating your pain with medication?  Yes   Are you currently taking medications for your pain? Yes            Current Outpatient Prescriptions:      Acetaminophen (TYLENOL PO), Take 1,000 mg by mouth every 6 hours as needed for mild pain or fever, Disp: , Rfl:      hydrochlorothiazide (HYDRODIURIL) 25 MG tablet, Take 25 mg by mouth every morning , Disp: , Rfl:      KLOR-CON 10 MEQ tablet, , Disp: , Rfl:      losartan (COZAAR) 50 MG tablet, Take 50 mg by mouth every morning , Disp: , Rfl:      metoprolol tartrate (LOPRESSOR) 50 MG tablet, Take 50 mg by mouth 2 times daily, Disp: , Rfl:      multivitamin, therapeutic with minerals (MULTI-VITAMIN) TABS tablet, Take 1 tablet by mouth every morning, Disp: , Rfl:      VITAMIN D, CHOLECALCIFEROL, PO, Take 1,000 Units by mouth every morning, Disp: , Rfl:      cyclobenzaprine (FLEXERIL) 10 MG tablet, Take 1 tablet (10 mg) by mouth 3 times daily (Patient not taking: Reported on 9/5/2018), Disp: 45 tablet, Rfl: 0     oxyCODONE IR (ROXICODONE) 5 MG tablet, Take 1-2 tablets (5-10 mg) by mouth every 3 hours as needed for other (pain control or improvement in physical function. Hold dose for analgesic side effects.) (Patient not taking: Reported on 9/5/2018), Disp: 75 tablet, Rfl: 0     polyethylene glycol (MIRALAX/GLYCOLAX) Packet, Take 17 g by mouth 3 times daily (Patient not taking: Reported on 9/5/2018), Disp: 30 packet, Rfl: 0     polyethylene glycol (MIRALAX/GLYCOLAX) powder, , Disp: , Rfl:      potassium aminobenzoate  "500 MG CAPS capsule, Take 1 capsule by mouth 2 times daily, Disp: , Rfl:      senna-docusate (SENOKOT-S;PERICOLACE) 8.6-50 MG per tablet, Take 3 tablets by mouth 2 times daily (Patient not taking: Reported on 9/5/2018), Disp: 100 tablet, Rfl: 0  Allergies   Allergen Reactions     Amlodipine Anaphylaxis     Lisinopril Cough     Cats Itching and Rash     Dogs Itching and Rash     PMH, SOC HIST, FAM HIST, PROBLEM LIST:  All reviewed in EPIC.    OBJECTIVE:  /90 (BP Location: Left arm, Patient Position: Chair, Cuff Size: Adult Regular)  Pulse 129  Temp 98.2  F (36.8  C) (Oral)  Ht 1.554 m (5' 1.2\")  Wt 64.9 kg (143 lb 1.6 oz)  SpO2 98%  Breastfeeding? No  BMI 26.86 kg/m2    Imaging:  These are the pertinent findings from:  Lumbar Xrays taken:  8/31/18  My impression: Overall alignment of the spine in 2 planes is satisfactory and unchanged from post op. Hardware is in good position without evidence failure.  Radiologist's report:   1. Stable postsurgical changes of L4-5 posterior instrumentation,  including grade 1 anterolisthesis at L4-5.  2. Mild multilevel spondylosis.  Please see Epic for the bulk of the report.  I personally reviewed the images with the patient    EXAM:  Well developed well nourished female found seated comfortably in exam chair.  No apparent distress. She is accompanied.  A&O X3.  Mood and affect WNL. Language and fund of knowledge intact.  Is able to sit and rise independently.   She has a nicely healed incision.    Exam at discharge:   Right: Iliopsoas 5/5, quadricep 5/5, hamstring 5/5, tibialis anterior 5/5, gastrocnemius 5/5, EHL 5/5  Left:  Iliopsoas 5/5, quadricep 5/5, hamstring 5/5, tibialis anterior 5/5, gastrocnemius 5/5, EHL 5/5  Sensation intact in bilateral L4-S1 dermatomes   Exam today:  No change    Assessment/Plan:  1. Spondylolisthesis, lumbar region    2. Vitamin D deficiency    3. Post-operative state      Kate OSCAR Hartman is doing well 6 weeks after her lumbar " fusion.  Her pain is overall gone.  She is progressing very fast.  Her vitamin D levels are good, films are good.  She should continue doing what she is doing.  We will see her again in 6 weeks with new x-rays.  The wound looks great.  *  All the patient's questions have been answered and they demonstrate good understanding of the above.    *   The patient has our contact information and is aware that she should call if she has questions comments or concerns.     We appreciate the opportunity to be of service in the care of this pleasant patient.  Please do call if there is anything more we can do    Joya Carvalho PA-C  [facility]  Department of Neurosurgery  Phone: [phone]  Fax: [phone]    This note was generated using voice recognition software. While edited for content some inaccurate phrasing may be found she does not have to use her walker anymore.

## 2018-09-05 NOTE — LETTER
9/5/2018       RE: Kate Hartman  [address]  [place] [address]     Dear Colleague,    Thank you for referring your patient, Kate Hartman, to the [facility]UROS[facility] at [facility]. Please see a copy of my visit note below.      Neurosurgery clinic post op  Date of visit: 9/5/2018      Procedure:    07/24/2018 Dima Dale  DIAGNOSES:     1.  L4 to L5 spondylolisthesis with radiculopathy.   2.  Flat back syndrome with pelvic incidence to lumbar lordosis mismatch.       PROCEDURE PERFORMED:   1.  Stealth image-guided L4 to L5 posterior instrumented fusion, modifier 22 for twice normal procedural length due to difficult anatomy with triangulated L5 vertebral body and severely hypertrophic facets.   2.  Insertion of bilateral intervertebral fusion device, L4 to L5, with local harvest autograft.   3.  Bilateral Rodriguez-Cespedes osteotomies, L4 to L5, for kyphosis deformity correction with decompressive bilateral foraminotomies and laminectomies  4.  Bilateral L4 to L5 posterior lateral fusion with local harvest autograft and allograft.       INDICATIONS FOR SURGERY:  Kate Hartman is a very pleasant 62-year-old woman who presented with low back pain and right leg pain and was found to have a grade I, L4 to L5 spondylolisthesis with severe lateral recess and foraminal stenosis, refractory to conservative management.  She had a pelvic incidence of 63 degrees and a lumbar lordosis of 45 degrees for a mismatch of 18 degrees.  She also had a synovial cyst on the right at L4 to L5 causing lateral recess compression.  The patient had a non-symptomatic cervical stenosis, for which we planned to use neural monitoring to ensure no worsening with prone positioning.  After a full discussion of risks versus benefits, she elected to proceed with surgery for L4 to L5 posterior instrumented fusion with interbody fusion and Rodriguez-Cespedes osteotomies for correction of kyphosis at the  L4 to L5 level.    HPI:  Inpatient:   Following surgery the patient was transferred to the general neurosurgical floor where she remained medically and neurologically stable.  The patient endorsed incisional pain however stated her pre-operative right leg pain had resolved.  The patient was voiding without difficulty.  The patient was evaluated by therapies who felt she was safe to discharge home with family and home health PT.    Post-operative x-rays showed proper positioning of surgical hardware.  Surgical drain was removed on post operative day #2, tip was intact upon removal.    The patient was tolerating diet, pain was controlled on oral analgesia, she was ambulating with assistance of walker, and she was passing bowel movements prior to discharge.     Patient discharged home with family post operative day #2.  Outpatient:   She is now 6 weeks post op.     Since discharge her right leg pain is gone, her back pain is minimal, left leg pain she had last time is gone. Has a little pressure feeling where the hardware is.  She has no neurologic deficits.  She is very pleased with her results thus far.  She has had lumbar spine films taken and vitamin D levels a few days ago.    STATUS REPORT  WORK:         Is not back at work.  Position: Part-time desk work, especially in tax season  ACTIVITY:     Has been following activity restrictions.    MEDS:                  Pain           Tylenol, Flexeril           NSAIDS    no  NICOTINE:   no    25 OH Total:   25 OH Vit D total <52  20 - 75 ug/L  05/31/2018  4:10 PM       25 OH Vit D2 <5   ug/L Final 08/31/2018 10:35 AM 51   25 OH Vit D3 64   ug/L Final 08/31/2018 10:35 AM 51   25 OH Vit D total <69  20 - 75 ug/L Final 08/31/2018 10:35 AM 51   Comment:     COLLAR/BRACE PLAN:  NA    Patient Supplied Answers To the UC Pain Questionnaire  UC Pain -  Patient Entered Questionnaire/Answers 8/24/2018   What number best describes your pain right now:  0 = No pain  to  10 = Worst  pain imaginable -   How would you describe the pain? dull, aching   Which of the following worsen your pain? standing, sitting, walking, coughing / sneezing   Which of the following improve or reduce your pain?  lying down, medication, relaxation, thinking about something else   What number best describes your average pain for the past week:  0 = No pain  to  10 = Worst pain imaginable 1   What number best describes your LOWEST pain in past 24 hours:  0 = No pain  to  10 = Worst pain imaginable 0   What number best describes your WORST pain in past 24 hours:  0 = No pain  to  10 = Worst pain imaginable 2   When is your pain worst? PM   What non-medicine treatments have you already had for your pain? exercise, other   Have you tried treating your pain with medication?  Yes   Are you currently taking medications for your pain? Yes            Current Outpatient Prescriptions:      Acetaminophen (TYLENOL PO), Take 1,000 mg by mouth every 6 hours as needed for mild pain or fever, Disp: , Rfl:      hydrochlorothiazide (HYDRODIURIL) 25 MG tablet, Take 25 mg by mouth every morning , Disp: , Rfl:      KLOR-CON 10 MEQ tablet, , Disp: , Rfl:      losartan (COZAAR) 50 MG tablet, Take 50 mg by mouth every morning , Disp: , Rfl:      metoprolol tartrate (LOPRESSOR) 50 MG tablet, Take 50 mg by mouth 2 times daily, Disp: , Rfl:      multivitamin, therapeutic with minerals (MULTI-VITAMIN) TABS tablet, Take 1 tablet by mouth every morning, Disp: , Rfl:      VITAMIN D, CHOLECALCIFEROL, PO, Take 1,000 Units by mouth every morning, Disp: , Rfl:      cyclobenzaprine (FLEXERIL) 10 MG tablet, Take 1 tablet (10 mg) by mouth 3 times daily (Patient not taking: Reported on 9/5/2018), Disp: 45 tablet, Rfl: 0     oxyCODONE IR (ROXICODONE) 5 MG tablet, Take 1-2 tablets (5-10 mg) by mouth every 3 hours as needed for other (pain control or improvement in physical function. Hold dose for analgesic side effects.) (Patient not taking: Reported on  "9/5/2018), Disp: 75 tablet, Rfl: 0     polyethylene glycol (MIRALAX/GLYCOLAX) Packet, Take 17 g by mouth 3 times daily (Patient not taking: Reported on 9/5/2018), Disp: 30 packet, Rfl: 0     polyethylene glycol (MIRALAX/GLYCOLAX) powder, , Disp: , Rfl:      potassium aminobenzoate 500 MG CAPS capsule, Take 1 capsule by mouth 2 times daily, Disp: , Rfl:      senna-docusate (SENOKOT-S;PERICOLACE) 8.6-50 MG per tablet, Take 3 tablets by mouth 2 times daily (Patient not taking: Reported on 9/5/2018), Disp: 100 tablet, Rfl: 0  Allergies   Allergen Reactions     Amlodipine Anaphylaxis     Lisinopril Cough     Cats Itching and Rash     Dogs Itching and Rash     PMH, SOC HIST, FAM HIST, PROBLEM LIST:  All reviewed in EPIC.    OBJECTIVE:  /90 (BP Location: Left arm, Patient Position: Chair, Cuff Size: Adult Regular)  Pulse 129  Temp 98.2  F (36.8  C) (Oral)  Ht 1.554 m (5' 1.2\")  Wt 64.9 kg (143 lb 1.6 oz)  SpO2 98%  Breastfeeding? No  BMI 26.86 kg/m2    Imaging:  These are the pertinent findings from:  Lumbar Xrays taken:  8/31/18  My impression: Overall alignment of the spine in 2 planes is satisfactory and unchanged from post op. Hardware is in good position without evidence failure.  Radiologist's report:   1. Stable postsurgical changes of L4-5 posterior instrumentation,  including grade 1 anterolisthesis at L4-5.  2. Mild multilevel spondylosis.  Please see Epic for the bulk of the report.  I personally reviewed the images with the patient    EXAM:  Well developed well nourished female found seated comfortably in exam chair.  No apparent distress. She is accompanied.  A&O X3.  Mood and affect WNL. Language and fund of knowledge intact.  Is able to sit and rise independently.   She has a nicely healed incision.    Exam at discharge:   Right: Iliopsoas 5/5, quadricep 5/5, hamstring 5/5, tibialis anterior 5/5, gastrocnemius 5/5, EHL 5/5  Left:  Iliopsoas 5/5, quadricep 5/5, hamstring 5/5, tibialis anterior " 5/5, gastrocnemius 5/5, EHL 5/5  Sensation intact in bilateral L4-S1 dermatomes   Exam today:  No change    Assessment/Plan:  1. Spondylolisthesis, lumbar region    2. Vitamin D deficiency    3. Post-operative state      Kate Hartman is doing well 6 weeks after her lumbar fusion.  Her pain is overall gone.  She is progressing very fast.  Her vitamin D levels are good, films are good.  She should continue doing what she is doing.  We will see her again in 6 weeks with new x-rays.  The wound looks great.  *  All the patient's questions have been answered and they demonstrate good understanding of the above.    *   The patient has our contact information and is aware that she should call if she has questions comments or concerns.     We appreciate the opportunity to be of service in the care of this pleasant patient.  Please do call if there is anything more we can do    Joya Carvalho PA-C  [facility]  Department of Neurosurgery  Phone: [phone]  Fax: [phone]    This note was generated using voice recognition software. While edited for content some inaccurate phrasing may be found she does not have to use her walker anymore.

## 2018-10-25 ENCOUNTER — OFFICE VISIT (OUTPATIENT)
Dept: NEUROSURGERY | Facility: CLINIC | Age: 63
End: 2018-10-25
Payer: COMMERCIAL

## 2018-10-25 ENCOUNTER — RADIANT APPOINTMENT (OUTPATIENT)
Dept: GENERAL RADIOLOGY | Facility: CLINIC | Age: 63
End: 2018-10-25
Attending: PHYSICIAN ASSISTANT
Payer: COMMERCIAL

## 2018-10-25 VITALS
HEIGHT: 61 IN | WEIGHT: 142.3 LBS | SYSTOLIC BLOOD PRESSURE: 138 MMHG | DIASTOLIC BLOOD PRESSURE: 83 MMHG | TEMPERATURE: 98 F | OXYGEN SATURATION: 97 % | RESPIRATION RATE: 16 BRPM | BODY MASS INDEX: 26.87 KG/M2 | HEART RATE: 64 BPM

## 2018-10-25 DIAGNOSIS — M43.06 LUMBAR SPONDYLOLYSIS: Primary | ICD-10-CM

## 2018-10-25 DIAGNOSIS — Z98.890 POST-OPERATIVE STATE: ICD-10-CM

## 2018-10-25 DIAGNOSIS — E55.9 VITAMIN D DEFICIENCY: ICD-10-CM

## 2018-10-25 DIAGNOSIS — M43.16 SPONDYLOLISTHESIS, LUMBAR REGION: ICD-10-CM

## 2018-10-25 PROBLEM — I49.1 PAC (PREMATURE ATRIAL CONTRACTION): Status: ACTIVE | Noted: 2018-01-05

## 2018-10-25 PROBLEM — I10 HYPERTENSION, ESSENTIAL: Status: ACTIVE | Noted: 2018-01-05

## 2018-10-25 ASSESSMENT — PAIN SCALES - GENERAL: PAINLEVEL: MILD PAIN (2)

## 2018-10-25 NOTE — NURSING NOTE
Chief Complaint   Patient presents with     Surgical Followup     [facility] RETURN F/U POST OP LUMBAR SURGERY     Preventive Care:    Breast Cancer Screening: During our visit today, we discussed that it is recommended you receive breast cancer screening. Please call or make an appointment with your primary care provider to discuss this with them. You may also call the [facility] scheduling line ([phone]) to set up a mammography appointment at the [facility] within the [facility].      Craig Nassar, EMT

## 2018-10-25 NOTE — PATIENT INSTRUCTIONS
Follow up with Joya Carvalho in three months with a x-rays and vitamin D level drawn before the appointment.    10 pound lifting restrictions and no squatting.

## 2018-10-25 NOTE — PROGRESS NOTES
Neurosurgery clinic post-op follow-up  Date of visit: 10/25/2018      Procedure:    07/24/2018 Dima Dale  DIAGNOSES:     1.  L4 to L5 spondylolisthesis with radiculopathy.   2.  Flat back syndrome with pelvic incidence to lumbar lordosis mismatch.       PROCEDURE PERFORMED:   1.  Stealth image-guided L4 to L5 posterior instrumented fusion, modifier 22 for twice normal procedural length due to difficult anatomy with triangulated L5 vertebral body and severely hypertrophic facets.   2.  Insertion of bilateral intervertebral fusion device, L4 to L5, with local harvest autograft.   3.  Bilateral Rodriguez-Cespedes osteotomies, L4 to L5, for kyphosis deformity correction with decompressive bilateral foraminotomies and laminectomies  4.  Bilateral L4 to L5 posterior lateral fusion with local harvest autograft and allograft.       INDICATIONS FOR SURGERY:  Kate Hartman is a very pleasant 62-year-old woman who presented with low back pain and right leg pain and was found to have a grade I, L4 to L5 spondylolisthesis with severe lateral recess and foraminal stenosis, refractory to conservative management.  She had a pelvic incidence of 63 degrees and a lumbar lordosis of 45 degrees for a mismatch of 18 degrees.  She also had a synovial cyst on the right at L4 to L5 causing lateral recess compression.  The patient had a non-symptomatic cervical stenosis, for which we planned to use neural monitoring to ensure no worsening with prone positioning.  After a full discussion of risks versus benefits, she elected to proceed with surgery for L4 to L5 posterior instrumented fusion with interbody fusion and Rodriguez-Cespedes osteotomies for correction of kyphosis at the L4 to L5 level.    HPI:  Inpatient:   Following surgery the patient was transferred to the general neurosurgical floor where she remained medically and neurologically stable.  The patient endorsed incisional pain however stated her pre-operative right leg pain had  "resolved.  The patient was voiding without difficulty.  The patient was evaluated by therapies who felt she was safe to discharge home with family and home health PT.    Post-operative x-rays showed proper positioning of surgical hardware.  Surgical drain was removed on post operative day #2, tip was intact upon removal.    The patient was tolerating diet, pain was controlled on oral analgesia, she was ambulating with assistance of walker, and she was passing bowel movements prior to discharge.     Patient discharged home with family post operative day #2.  Outpatient:   She is now 3 months post op. Since discharge her right leg pain is gone, her back pain is minimal, left leg pain is gone. Has a little pressure feeling where the hardware is.  She has no neurologic deficits.  She is very pleased with her results thus far.  Lumbar spine XRs look appropriate, she is taking Vitamin D. Eager to resume walking and increase her activity.    STATUS REPORT  WORK:         Is not back at work.  Position: Part-time desk work, especially in tax season  ACTIVITY:     Has been following activity restrictions.    MEDS:                  Pain           Tylenol, Flexeril           NSAIDS    no  NICOTINE:   no    25 OH Total:   25 OH Vit D total <52  20 - 75 ug/L  05/31/2018  4:10 PM       25 OH Vit D2 <5   ug/L Final 08/31/2018 10:35 AM 51   25 OH Vit D3 64   ug/L Final 08/31/2018 10:35 AM 51   25 OH Vit D total <69  20 - 75 ug/L Final 08/31/2018 10:35 AM 51   Comment:     COLLAR/BRACE PLAN:  NA    OBJECTIVE:  /83  Pulse 64  Temp 98  F (36.7  C) (Oral)  Resp 16  Ht 1.554 m (5' 1.2\")  Wt 64.5 kg (142 lb 4.8 oz)  SpO2 97%  Breastfeeding? No  BMI 26.71 kg/m2    Imaging:  These are the pertinent findings from:  Lumbar Xrays taken: 10/25/18  My impression: Overall alignment of the spine in 2 planes is satisfactory and unchanged from post op. Hardware is in good position without evidence failure.  Radiologist's report " pending.    I personally reviewed the images with the patient    EXAM:  Well developed well nourished female found seated comfortably in exam chair.  No apparent distress. She is accompanied.  A&O X3.  Mood and affect WNL. Language and fund of knowledge intact.  Is able to sit and rise independently.   She has a nicely healed incision.    Exam at discharge:   Right: Iliopsoas 5/5, quadricep 5/5, hamstring 5/5, tibialis anterior 5/5, gastrocnemius 5/5, EHL 5/5  Left:  Iliopsoas 5/5, quadricep 5/5, hamstring 5/5, tibialis anterior 5/5, gastrocnemius 5/5, EHL 5/5  Sensation intact in bilateral L4-S1 dermatomes   Exam today:  No change    Assessment/Plan:    Kate Hartman is a 61 yo F with PMH Grade 1 L4-5 spondylolisthesis, PILL mismatch, and R L4-5 synovial cyst, now doing well 3 months after her L4-5 fusion.  Her pain is overall gone.  No new concerns. Eager to resume walking, and increase her activity. Her vitamin D levels are good, films are appropriate.  Incision looks good.     - Will refer patient for physical therapy  - May walk as tolerated. Please continue to hold off on lifting greater than 10 lbs.  - May take NSAIDs as needed for pain.  - Continue taking Vitamin D.  - Will follow-up in 3 months with Joya Carvalho PA-C with repeat lumbar spine XR standing AP/lateral and vitamin D level.       Tal Kurtz MD  Neurosurgery Resident PGY1    I saw, evaluated, and examined the patient with the resident and personally reviewed and interpreted all imaging and labs and formulated the plan.    Fartun Ch MD    [facility] Department of Neurosurgery  Office: [phone]    10/25/2018  12:01 PM

## 2018-10-25 NOTE — MR AVS SNAPSHOT
After Visit Summary   10/25/2018    Kate Hartman    MRN: [id number]           Patient Information     Date Of Birth          1955        Visit Information        Provider Department      10/25/2018 11:00 AM Fartun Ch MD [facility] Neurosurgery        Today's Diagnoses     Lumbar spondylolysis    -  1    Vitamin D deficiency          Care Instructions    Follow up with Joya Carvalho in three months with a x-rays and vitamin D level drawn before the appointment.    10 pound lifting restrictions and no squatting.          Follow-ups after your visit        Additional Services     PT Referral (External Facility)       Physical Therapy Location:  Location of the patient's choice.  Lifting restriction of 10 pounds and no squatting.                  Follow-up notes from your care team     Return in about 3 months (around 1/25/2019).      Your next 10 appointments already scheduled     Jan 25, 2019 10:30 AM CST   LAB with  LAB   [facility] ([facility])    [address] [place] [id number] [phone]           Please do not eat 10-12 hours before your appointment if you are coming in fasting for labs on lipids, cholesterol, or glucose (sugar). This does not apply to pregnant women. Water, hot tea and black coffee (with nothing added) are okay. Do not drink other fluids, diet soda or chew gum.            Jan 28, 2019  9:45 AM CST   XR LUMBAR SPINE G/E 4 VIEWS with UCXR1   [facility] ([facility])    [address] [place] [address] [id number] [phone]           How do I prepare for my exam? (Food and drink instructions) No Food and Drink Restrictions.  How do I prepare for my exam? (Other instructions) You do not need to do anything special for this exam.  What should I wear: Wear comfortable clothes.  How long does the exam take: Most scans take less than 5 minutes.   What should I bring: Bring a list of your medicines, including vitamins, minerals and over-the-counter drugs. It is safest to leave personal items at home.  Do I need a :  No  is needed.  What do I need to tell my doctor: Tell your doctor if there s any chance you are pregnant.  What should I do after the exam: No restrictions, You may resume normal activities.  What is this test: An image of a specific body part shown in shades of black and white.  Who should I call with questions: If you have any questions, please call the Imaging Department where you will have your exam. Directions, parking instructions, and other information is available on our website, [email or web address].            Jan 28, 2019 10:30 AM CST   (Arrive by 10:15 AM)   Return Visit with Joya Carvalho PA-C   [facility] ([facility])    [address] [place] [id number] [phone]              Future tests that were ordered for you today     Open Future Orders        Priority Expected Expires Ordered    X-ray Lumbar spine G/E 4 views (AP, lateral, flexion, extension - standing views preferred) [IMG69] Routine 10/25/2018 10/25/2019 10/25/2018    25 Hydroxyvitamin D2 and D3 Routine 10/25/2018 10/26/2019 10/25/2018    PT Referral (External Facility) Routine  10/25/2019 10/25/2018            Who to contact     Please call your clinic at [phone] to:    Ask questions about your health    Make or cancel appointments    Discuss your medicines    Learn about your test results    Speak to your doctor            Additional Information About Your Visit        [employer] Information     [employer] gives you secure access to your electronic health record. If you see a primary care provider, you can also send messages to your care team and make appointments. If you have questions, please call your primary care clinic.  If you do not have a primary care provider, please call  "[phone] and they will assist you.      [employer] is an electronic gateway that provides easy, online access to your medical records. With [employer], you can request a clinic appointment, read your test results, renew a prescription or communicate with your care team.     To access your existing account, please contact your [facility] or call [phone] for assistance.        Care EveryWhere ID     This is your Care EveryWhere ID. This could be used by other organizations to access your [place] medical records  [id number]        Your Vitals Were     Pulse Temperature Respirations Height Pulse Oximetry Breastfeeding?    64 98  F (36.7  C) (Oral) 16 1.554 m (5' 1.2\") 97% No    BMI (Body Mass Index)                   26.71 kg/m2            Blood Pressure from Last 3 Encounters:   10/25/18 138/83   09/05/18 144/90   08/07/18 133/80    Weight from Last 3 Encounters:   10/25/18 64.5 kg (142 lb 4.8 oz)   09/05/18 64.9 kg (143 lb 1.6 oz)   08/07/18 66.2 kg (146 lb)               Primary Care Provider Office Phone # Fax #    Brianna Ghada Guevara [phone]       [place] [address] [address] [facility] [address]        Equal Access to Services     BRIEN BOSS : Hadii aad ku hadasho Soomaali, waaxda luqadaha, qaybta kaalmada adeegyada, waxay idiin hayaan esmer esteban . So [facility] [phone].    ATENCIÓN: Si habla español, tiene a burnett disposición servicios gratuitos de asistencia lingüística. Llame al [phone].    We comply with applicable federal civil rights laws and [place] laws. We do not discriminate on the basis of race, color, national origin, age, disability, sex, sexual orientation, or gender identity.            Thank you!     Thank you for choosing [facility]  for your care. Our goal is always to provide you with excellent care. Hearing back from our patients is one way we can continue to improve our services. Please take a few minutes " to complete the written survey that you may receive in the mail after your visit with us. Thank you!             Your Updated Medication List - Protect others around you: Learn how to safely use, store and throw away your medicines at www.disposemymeds.org.          This list is accurate as of 10/25/18 11:33 AM.  Always use your most recent med list.                   Brand Name Dispense Instructions for use Diagnosis    cyclobenzaprine 10 MG tablet    FLEXERIL    45 tablet    Take 1 tablet (10 mg) by mouth 3 times daily    S/P lumbar fusion       hydrochlorothiazide 25 MG tablet    HYDRODIURIL     Take 25 mg by mouth every morning        KLOR-CON 10 MEQ tablet   Generic drug:  potassium chloride           losartan 50 MG tablet    COZAAR     Take 50 mg by mouth every morning        metoprolol tartrate 50 MG tablet    LOPRESSOR     Take 50 mg by mouth 2 times daily        Multi-vitamin Tabs tablet      Take 1 tablet by mouth every morning        oxyCODONE IR 5 MG tablet    ROXICODONE    75 tablet    Take 1-2 tablets (5-10 mg) by mouth every 3 hours as needed for other (pain control or improvement in physical function. Hold dose for analgesic side effects.)    S/P lumbar fusion       * polyethylene glycol Packet    MIRALAX/GLYCOLAX    30 packet    Take 17 g by mouth 3 times daily    S/P lumbar fusion       * polyethylene glycol powder    MIRALAX/GLYCOLAX          potassium aminobenzoate 500 MG Caps capsule      Take 1 capsule by mouth 2 times daily        senna-docusate 8.6-50 MG per tablet    SENOKOT-S;PERICOLACE    100 tablet    Take 3 tablets by mouth 2 times daily    S/P lumbar fusion       TYLENOL PO      Take 1,000 mg by mouth every 6 hours as needed for mild pain or fever        VITAMIN D (CHOLECALCIFEROL) PO      Take 1,000 Units by mouth every morning        * Notice:  This list has 2 medication(s) that are the same as other medications prescribed for you. Read the directions carefully, and ask your doctor or  other care provider to review them with you.

## 2018-10-25 NOTE — LETTER
10/25/2018       RE: Kate Hartman  [address]  [place] [address]     Dear Colleague,    Thank you for referring your patient, Kate Hartman, to the [facility] NEUROSURGERY at [facility]. Please see a copy of my visit note below.      Neurosurgery clinic post-op follow-up  Date of visit: 10/25/2018      Procedure:    07/24/2018 Dima Dale  DIAGNOSES:     1.  L4 to L5 spondylolisthesis with radiculopathy.   2.  Flat back syndrome with pelvic incidence to lumbar lordosis mismatch.       PROCEDURE PERFORMED:   1.  Stealth image-guided L4 to L5 posterior instrumented fusion, modifier 22 for twice normal procedural length due to difficult anatomy with triangulated L5 vertebral body and severely hypertrophic facets.   2.  Insertion of bilateral intervertebral fusion device, L4 to L5, with local harvest autograft.   3.  Bilateral Rodriguez-Cespedes osteotomies, L4 to L5, for kyphosis deformity correction with decompressive bilateral foraminotomies and laminectomies  4.  Bilateral L4 to L5 posterior lateral fusion with local harvest autograft and allograft.       INDICATIONS FOR SURGERY:  Kate Hartman is a very pleasant 62-year-old woman who presented with low back pain and right leg pain and was found to have a grade I, L4 to L5 spondylolisthesis with severe lateral recess and foraminal stenosis, refractory to conservative management.  She had a pelvic incidence of 63 degrees and a lumbar lordosis of 45 degrees for a mismatch of 18 degrees.  She also had a synovial cyst on the right at L4 to L5 causing lateral recess compression.  The patient had a non-symptomatic cervical stenosis, for which we planned to use neural monitoring to ensure no worsening with prone positioning.  After a full discussion of risks versus benefits, she elected to proceed with surgery for L4 to L5 posterior instrumented fusion with interbody fusion and Rodriguez-Cespedes osteotomies for correction of  "kyphosis at the L4 to L5 level.    HPI:  Inpatient:   Following surgery the patient was transferred to the general neurosurgical floor where she remained medically and neurologically stable.  The patient endorsed incisional pain however stated her pre-operative right leg pain had resolved.  The patient was voiding without difficulty.  The patient was evaluated by therapies who felt she was safe to discharge home with family and home health PT.    Post-operative x-rays showed proper positioning of surgical hardware.  Surgical drain was removed on post operative day #2, tip was intact upon removal.    The patient was tolerating diet, pain was controlled on oral analgesia, she was ambulating with assistance of walker, and she was passing bowel movements prior to discharge.     Patient discharged home with family post operative day #2.  Outpatient:   She is now 3 months post op. Since discharge her right leg pain is gone, her back pain is minimal, left leg pain is gone. Has a little pressure feeling where the hardware is.  She has no neurologic deficits.  She is very pleased with her results thus far.  Lumbar spine XRs look appropriate, she is taking Vitamin D. Eager to resume walking and increase her activity.    STATUS REPORT  WORK:         Is not back at work.  Position: Part-time desk work, especially in tax season  ACTIVITY:     Has been following activity restrictions.    MEDS:                  Pain           Tylenol, Flexeril           NSAIDS    no  NICOTINE:   no    25 OH Total:   25 OH Vit D total <52  20 - 75 ug/L  05/31/2018  4:10 PM       25 OH Vit D2 <5   ug/L Final 08/31/2018 10:35 AM 51   25 OH Vit D3 64   ug/L Final 08/31/2018 10:35 AM 51   25 OH Vit D total <69  20 - 75 ug/L Final 08/31/2018 10:35 AM 51   Comment:     COLLAR/BRACE PLAN:  NA    OBJECTIVE:  /83  Pulse 64  Temp 98  F (36.7  C) (Oral)  Resp 16  Ht 1.554 m (5' 1.2\")  Wt 64.5 kg (142 lb 4.8 oz)  SpO2 97%  Breastfeeding? No  BMI " 26.71 kg/m2    Imaging:  These are the pertinent findings from:  Lumbar Xrays taken: 10/25/18  My impression: Overall alignment of the spine in 2 planes is satisfactory and unchanged from post op. Hardware is in good position without evidence failure.  Radiologist's report pending.    I personally reviewed the images with the patient    EXAM:  Well developed well nourished female found seated comfortably in exam chair.  No apparent distress. She is accompanied.  A&O X3.  Mood and affect WNL. Language and fund of knowledge intact.  Is able to sit and rise independently.   She has a nicely healed incision.    Exam at discharge:   Right: Iliopsoas 5/5, quadricep 5/5, hamstring 5/5, tibialis anterior 5/5, gastrocnemius 5/5, EHL 5/5  Left:  Iliopsoas 5/5, quadricep 5/5, hamstring 5/5, tibialis anterior 5/5, gastrocnemius 5/5, EHL 5/5  Sensation intact in bilateral L4-S1 dermatomes   Exam today:  No change    Assessment/Plan:    Kate Hartman is a 61 yo F with PMH Grade 1 L4-5 spondylolisthesis, PILL mismatch, and R L4-5 synovial cyst, now doing well 3 months after her L4-5 fusion.  Her pain is overall gone.  No new concerns. Eager to resume walking, and increase her activity. Her vitamin D levels are good, films are appropriate.  Incision looks good.     - Will refer patient for physical therapy  - May walk as tolerated. Please continue to hold off on lifting greater than 10 lbs.  - May take NSAIDs as needed for pain.  - Continue taking Vitamin D.  - Will follow-up in 3 months with Joya Carvalho PA-C with repeat lumbar spine XR standing AP/lateral and vitamin D level.       Tal Kurtz MD  Neurosurgery Resident PGY1    I saw, evaluated, and examined the patient with the resident and personally reviewed and interpreted all imaging and labs and formulated the plan.    Fartun Ch MD    [facility] Department of Neurosurgery  Office: [phone]    10/25/2018  12:01 PM

## 2019-01-25 DIAGNOSIS — E55.9 VITAMIN D DEFICIENCY: ICD-10-CM

## 2019-01-28 ENCOUNTER — ANCILLARY PROCEDURE (OUTPATIENT)
Dept: GENERAL RADIOLOGY | Facility: CLINIC | Age: 64
End: 2019-01-28
Attending: NEUROLOGICAL SURGERY
Payer: COMMERCIAL

## 2019-01-28 ENCOUNTER — OFFICE VISIT (OUTPATIENT)
Dept: NEUROSURGERY | Facility: CLINIC | Age: 64
End: 2019-01-28
Payer: COMMERCIAL

## 2019-01-28 VITALS
TEMPERATURE: 97.5 F | DIASTOLIC BLOOD PRESSURE: 69 MMHG | SYSTOLIC BLOOD PRESSURE: 131 MMHG | WEIGHT: 154.4 LBS | BODY MASS INDEX: 29.15 KG/M2 | HEART RATE: 60 BPM | RESPIRATION RATE: 14 BRPM | OXYGEN SATURATION: 96 % | HEIGHT: 61 IN

## 2019-01-28 DIAGNOSIS — E55.9 VITAMIN D DEFICIENCY: ICD-10-CM

## 2019-01-28 DIAGNOSIS — M43.06 LUMBAR SPONDYLOLYSIS: ICD-10-CM

## 2019-01-28 DIAGNOSIS — M43.16 SPONDYLOLISTHESIS, LUMBAR REGION: Primary | ICD-10-CM

## 2019-01-28 DIAGNOSIS — Z98.1 S/P LUMBAR FUSION: ICD-10-CM

## 2019-01-28 LAB
DEPRECATED CALCIDIOL+CALCIFEROL SERPL-MC: <74 UG/L (ref 20–75)
VITAMIN D2 SERPL-MCNC: <5 UG/L
VITAMIN D3 SERPL-MCNC: 69 UG/L

## 2019-01-28 ASSESSMENT — PAIN SCALES - GENERAL: PAINLEVEL: NO PAIN (0)

## 2019-01-28 ASSESSMENT — MIFFLIN-ST. JEOR: SCORE: 1200.6

## 2019-01-28 NOTE — NURSING NOTE
Chief Complaint   Patient presents with     RECHECK     6 MONTH     Medication Reconciliation     NEED ATTENTION     Louise Garcia

## 2019-01-28 NOTE — LETTER
1/28/2019       RE: Kate Hartman  [address]  [place] [address]     Dear Colleague,    Thank you for referring your patient, Kate Hartman, to the [facility] NEUROSURGERY at [facility]. Please see a copy of my visit note below.      Neurosurgery clinic post op  Date of visit: 1/28/19     Procedure:    07/24/2018 Dima Dale  DIAGNOSES:     1.  L4 to L5 spondylolisthesis with radiculopathy.   2.  Flat back syndrome with pelvic incidence to lumbar lordosis mismatch.       PROCEDURE PERFORMED:   1.  Stealth image-guided L4 to L5 posterior instrumented fusion, modifier 22 for twice normal procedural length due to difficult anatomy with triangulated L5 vertebral body and severely hypertrophic facets.   2.  Insertion of bilateral intervertebral fusion device, L4 to L5, with local harvest autograft.   3.  Bilateral Rodriguez-Cespedes osteotomies, L4 to L5, for kyphosis deformity correction with decompressive bilateral foraminotomies and laminectomies  4.  Bilateral L4 to L5 posterior lateral fusion with local harvest autograft and allograft.       INDICATIONS FOR SURGERY:  Kate Hartman is a very pleasant 62-year-old woman who presented with low back pain and right leg pain and was found to have a grade I, L4 to L5 spondylolisthesis with severe lateral recess and foraminal stenosis, refractory to conservative management.  She had a pelvic incidence of 63 degrees and a lumbar lordosis of 45 degrees for a mismatch of 18 degrees.  She also had a synovial cyst on the right at L4 to L5 causing lateral recess compression.  The patient had a non-symptomatic cervical stenosis, for which we planned to use neural monitoring to ensure no worsening with prone positioning.  After a full discussion of risks versus benefits, she elected to proceed with surgery for L4 to L5 posterior instrumented fusion with interbody fusion and Rodriguez-Cespedes osteotomies for correction of kyphosis at the  L4 to L5 level.    HPI:  She is now 6 months post op.     Since discharge she has no back or leg pain.  She has returned to work in tax season.  This is a very busy time of the year for her but she is allowed to take breaks as needed.  She is doing essentially everything she likes to do. She has no neurologic deficits.  She is very pleased with her results thus far.  She has had lumbar spine films taken and vitamin D levels a few days ago.    STATUS REPORT  WORK:         Is back at work.  Position: Part-time desk work, especially in tax season      MEDS:                  Pain          Tylenol, Flexeril           NSAIDS    no  NICOTINE:    no    25 OH Total:   25 OH Vit D total <52  20 - 75 ug/L  05/31/2018  4:10 PM       25 OH Vit D2 <5   ug/L  08/31/2018 10:35 AM 51   25 OH Vit D3 64   ug/L   51   25 OH Vit D total <69  20 - 75 ug/L   51   Comment:     1/25/19 PENDING    Patient Supplied Answers To the [facility] Pain Questionnaire  [facility] Pain -  Patient Entered Questionnaire/Answers 1/25/2019   What number best describes your pain right now:  0 = No pain  to  10 = Worst pain imaginable 1   How would you describe the pain? dull, aching   Which of the following worsen your pain? standing   Which of the following improve or reduce your pain?  sitting, walking, exercise, relaxation   What number best describes your average pain for the past week:  0 = No pain  to  10 = Worst pain imaginable 1   What number best describes your LOWEST pain in past 24 hours:  0 = No pain  to  10 = Worst pain imaginable 0   What number best describes your WORST pain in past 24 hours:  0 = No pain  to  10 = Worst pain imaginable 2   When is your pain worst? PM   What non-medicine treatments have you already had for your pain? exercise, other   Have you tried treating your pain with medication?  No   Are you currently taking medications for your pain? No            Current Outpatient Medications:      Acetaminophen (TYLENOL PO), Take 1,000 mg by mouth  "every 6 hours as needed for mild pain or fever, Disp: , Rfl:      hydrochlorothiazide (HYDRODIURIL) 25 MG tablet, Take 25 mg by mouth every morning , Disp: , Rfl:      losartan (COZAAR) 50 MG tablet, Take 50 mg by mouth every morning , Disp: , Rfl:      metoprolol tartrate (LOPRESSOR) 50 MG tablet, Take 50 mg by mouth 2 times daily, Disp: , Rfl:      multivitamin, therapeutic with minerals (MULTI-VITAMIN) TABS tablet, Take 1 tablet by mouth every morning, Disp: , Rfl:      VITAMIN D, CHOLECALCIFEROL, PO, Take 1,000 Units by mouth every morning, Disp: , Rfl:      cyclobenzaprine (FLEXERIL) 10 MG tablet, Take 1 tablet (10 mg) by mouth 3 times daily (Patient not taking: Reported on 9/5/2018), Disp: 45 tablet, Rfl: 0     KLOR-CON 10 MEQ tablet, , Disp: , Rfl:      oxyCODONE IR (ROXICODONE) 5 MG tablet, Take 1-2 tablets (5-10 mg) by mouth every 3 hours as needed for other (pain control or improvement in physical function. Hold dose for analgesic side effects.) (Patient not taking: Reported on 9/5/2018), Disp: 75 tablet, Rfl: 0     polyethylene glycol (MIRALAX/GLYCOLAX) Packet, Take 17 g by mouth 3 times daily (Patient not taking: Reported on 9/5/2018), Disp: 30 packet, Rfl: 0     polyethylene glycol (MIRALAX/GLYCOLAX) powder, , Disp: , Rfl:      potassium aminobenzoate 500 MG CAPS capsule, Take 1 capsule by mouth 2 times daily, Disp: , Rfl:      senna-docusate (SENOKOT-S;PERICOLACE) 8.6-50 MG per tablet, Take 3 tablets by mouth 2 times daily (Patient not taking: Reported on 1/28/2019), Disp: 100 tablet, Rfl: 0  Allergies   Allergen Reactions     Amlodipine Anaphylaxis     Lisinopril Cough     Cats Itching and Rash     Dogs Itching and Rash     PMH, SOC HIST, FAM HIST, PROBLEM LIST:  All reviewed in EPIC.    OBJECTIVE:  /69 (BP Location: Left arm, Patient Position: Sitting, Cuff Size: Adult Regular)   Pulse 60   Temp 97.5  F (36.4  C) (Oral)   Resp 14   Ht 1.554 m (5' 1.18\")   Wt 70 kg (154 lb 6.4 oz)   SpO2 " 96%   BMI 29.00 kg/m       Imaging:  These are the pertinent findings from:  Lumbar Xrays taken:  1/28/19  My impression: Overall alignment of the spine in 2 planes is satisfactory and unchanged from post op. Hardware is in good position without evidence failure.  There is slight darkness, question haloing, around the L4 screws. Not very different from post op images. However no evidence of looseness otherwise.  No malalignment or any other issues.  Radiologist's report:   1. Fusion instrumentation at L4-L5 without evidence of hardware  complication.  2. No abnormal motion with flexion/extension.  3. Mild convex right curvature of the thoracolumbar/lumbar spine with  apex at L1.  4. Mild disc height loss at L3-L4 and L5-S1  Please see Epic for the bulk of the report.  I personally reviewed the images with the patient    EXAM:  Well developed well nourished female found seated comfortably in exam chair.  No apparent distress. She is Unaccompanied.  A&O X3.  Mood and affect WNL. Language and fund of knowledge intact.  Is able to sit and rise independently.   She has a nicely healed incision.    Right: Iliopsoas 5/5, quadricep 5/5, hamstring 5/5, tibialis anterior 5/5, gastrocnemius 5/5, EHL 5/5  Left:  Iliopsoas 5/5, quadricep 5/5, hamstring 5/5, tibialis anterior 5/5, gastrocnemius 5/5, EHL 5/5  Sensation intact in bilateral L4-S1 dermatomes     Assessment/Plan:  1. Spondylolisthesis, lumbar region    2. S/P lumbar fusion    3. Vitamin D deficiency      Kate Hartman is doing well 6 months after her lumbar fusion.  She is pain-free.  Vitamin D levels are pending, and the images overall look good.  I have a question about the apparent haloing around the L4 screws.  I do not think this is significant since she is pain-free but I am going to keep her on vitamin D for now.  I plan to see her in Dr. Ch clinic in 6 months with plain x-rays and flexion-extension.  However I have also sent a message to Dr. Ch to  ask her if she would like different imaging.  I have discussed all this with the patient.  Including my reassurance that I do not think there is anything of real concern on the images.  And I would not change any of her habits or routines at this moment    *  All the patient's questions have been answered and they demonstrate good understanding of the above.    *   The patient has our contact information and is aware that she should call if she has questions comments or concerns.     We appreciate the opportunity to be of service in the care of this pleasant patient.  Please do call if there is anything more we can do    This note was generated using voice recognition software. While edited for content some inaccurate phrasing may be found she does not have to use her walker anymore.    Again, thank you for allowing me to participate in the care of your patient.      Sincerely,    Joya Carvalho PA-C

## 2019-01-28 NOTE — PROGRESS NOTES
Neurosurgery clinic post op  Date of visit: 1/28/19     Procedure:    07/24/2018 Dima Suyapa  DIAGNOSES:     1.  L4 to L5 spondylolisthesis with radiculopathy.   2.  Flat back syndrome with pelvic incidence to lumbar lordosis mismatch.       PROCEDURE PERFORMED:   1.  Stealth image-guided L4 to L5 posterior instrumented fusion, modifier 22 for twice normal procedural length due to difficult anatomy with triangulated L5 vertebral body and severely hypertrophic facets.   2.  Insertion of bilateral intervertebral fusion device, L4 to L5, with local harvest autograft.   3.  Bilateral Rodriguez-Cespedes osteotomies, L4 to L5, for kyphosis deformity correction with decompressive bilateral foraminotomies and laminectomies  4.  Bilateral L4 to L5 posterior lateral fusion with local harvest autograft and allograft.       INDICATIONS FOR SURGERY:  Kate Hartman is a very pleasant 62-year-old woman who presented with low back pain and right leg pain and was found to have a grade I, L4 to L5 spondylolisthesis with severe lateral recess and foraminal stenosis, refractory to conservative management.  She had a pelvic incidence of 63 degrees and a lumbar lordosis of 45 degrees for a mismatch of 18 degrees.  She also had a synovial cyst on the right at L4 to L5 causing lateral recess compression.  The patient had a non-symptomatic cervical stenosis, for which we planned to use neural monitoring to ensure no worsening with prone positioning.  After a full discussion of risks versus benefits, she elected to proceed with surgery for L4 to L5 posterior instrumented fusion with interbody fusion and Rodriguez-Cespedes osteotomies for correction of kyphosis at the L4 to L5 level.    HPI:  She is now 6 months post op.     Since discharge she has no back or leg pain.  She has returned to work in tax season.  This is a very busy time of the year for her but she is allowed to take breaks as needed.  She is doing essentially everything she likes  to do. She has no neurologic deficits.  She is very pleased with her results thus far.  She has had lumbar spine films taken and vitamin D levels a few days ago.    STATUS REPORT  WORK:         Is back at work.  Position: Part-time desk work, especially in tax season      MEDS:                  Pain          Tylenol, Flexeril           NSAIDS    no  NICOTINE:    no    25 OH Total:   25 OH Vit D total <52  20 - 75 ug/L  05/31/2018  4:10 PM       25 OH Vit D2 <5   ug/L  08/31/2018 10:35 AM 51   25 OH Vit D3 64   ug/L   51   25 OH Vit D total <69  20 - 75 ug/L   51   Comment:     1/25/19 PENDING    Patient Supplied Answers To the [facility] Pain Questionnaire  [facility] Pain -  Patient Entered Questionnaire/Answers 1/25/2019   What number best describes your pain right now:  0 = No pain  to  10 = Worst pain imaginable 1   How would you describe the pain? dull, aching   Which of the following worsen your pain? standing   Which of the following improve or reduce your pain?  sitting, walking, exercise, relaxation   What number best describes your average pain for the past week:  0 = No pain  to  10 = Worst pain imaginable 1   What number best describes your LOWEST pain in past 24 hours:  0 = No pain  to  10 = Worst pain imaginable 0   What number best describes your WORST pain in past 24 hours:  0 = No pain  to  10 = Worst pain imaginable 2   When is your pain worst? PM   What non-medicine treatments have you already had for your pain? exercise, other   Have you tried treating your pain with medication?  No   Are you currently taking medications for your pain? No            Current Outpatient Medications:      Acetaminophen (TYLENOL PO), Take 1,000 mg by mouth every 6 hours as needed for mild pain or fever, Disp: , Rfl:      hydrochlorothiazide (HYDRODIURIL) 25 MG tablet, Take 25 mg by mouth every morning , Disp: , Rfl:      losartan (COZAAR) 50 MG tablet, Take 50 mg by mouth every morning , Disp: , Rfl:      metoprolol tartrate  "(LOPRESSOR) 50 MG tablet, Take 50 mg by mouth 2 times daily, Disp: , Rfl:      multivitamin, therapeutic with minerals (MULTI-VITAMIN) TABS tablet, Take 1 tablet by mouth every morning, Disp: , Rfl:      VITAMIN D, CHOLECALCIFEROL, PO, Take 1,000 Units by mouth every morning, Disp: , Rfl:      cyclobenzaprine (FLEXERIL) 10 MG tablet, Take 1 tablet (10 mg) by mouth 3 times daily (Patient not taking: Reported on 9/5/2018), Disp: 45 tablet, Rfl: 0     KLOR-CON 10 MEQ tablet, , Disp: , Rfl:      oxyCODONE IR (ROXICODONE) 5 MG tablet, Take 1-2 tablets (5-10 mg) by mouth every 3 hours as needed for other (pain control or improvement in physical function. Hold dose for analgesic side effects.) (Patient not taking: Reported on 9/5/2018), Disp: 75 tablet, Rfl: 0     polyethylene glycol (MIRALAX/GLYCOLAX) Packet, Take 17 g by mouth 3 times daily (Patient not taking: Reported on 9/5/2018), Disp: 30 packet, Rfl: 0     polyethylene glycol (MIRALAX/GLYCOLAX) powder, , Disp: , Rfl:      potassium aminobenzoate 500 MG CAPS capsule, Take 1 capsule by mouth 2 times daily, Disp: , Rfl:      senna-docusate (SENOKOT-S;PERICOLACE) 8.6-50 MG per tablet, Take 3 tablets by mouth 2 times daily (Patient not taking: Reported on 1/28/2019), Disp: 100 tablet, Rfl: 0  Allergies   Allergen Reactions     Amlodipine Anaphylaxis     Lisinopril Cough     Cats Itching and Rash     Dogs Itching and Rash     PMH, SOC HIST, FAM HIST, PROBLEM LIST:  All reviewed in EPIC.    OBJECTIVE:  /69 (BP Location: Left arm, Patient Position: Sitting, Cuff Size: Adult Regular)   Pulse 60   Temp 97.5  F (36.4  C) (Oral)   Resp 14   Ht 1.554 m (5' 1.18\")   Wt 70 kg (154 lb 6.4 oz)   SpO2 96%   BMI 29.00 kg/m      Imaging:  These are the pertinent findings from:  Lumbar Xrays taken:  1/28/19  My impression: Overall alignment of the spine in 2 planes is satisfactory and unchanged from post op. Hardware is in good position without evidence failure.  There is " slight darkness, question haloing, around the L4 screws. Not very different from post op images. However no evidence of looseness otherwise.  No malalignment or any other issues.  Radiologist's report:   1. Fusion instrumentation at L4-L5 without evidence of hardware  complication.  2. No abnormal motion with flexion/extension.  3. Mild convex right curvature of the thoracolumbar/lumbar spine with  apex at L1.  4. Mild disc height loss at L3-L4 and L5-S1  Please see Epic for the bulk of the report.  I personally reviewed the images with the patient    EXAM:  Well developed well nourished female found seated comfortably in exam chair.  No apparent distress. She is Unaccompanied.  A&O X3.  Mood and affect WNL. Language and fund of knowledge intact.  Is able to sit and rise independently.   She has a nicely healed incision.    Right: Iliopsoas 5/5, quadricep 5/5, hamstring 5/5, tibialis anterior 5/5, gastrocnemius 5/5, EHL 5/5  Left:  Iliopsoas 5/5, quadricep 5/5, hamstring 5/5, tibialis anterior 5/5, gastrocnemius 5/5, EHL 5/5  Sensation intact in bilateral L4-S1 dermatomes     Assessment/Plan:  1. Spondylolisthesis, lumbar region    2. S/P lumbar fusion    3. Vitamin D deficiency      Kate Hartman is doing well 6 months after her lumbar fusion.  She is pain-free.  Vitamin D levels are pending, and the images overall look good.  I have a question about the apparent haloing around the L4 screws.  I do not think this is significant since she is pain-free but I am going to keep her on vitamin D for now.  I plan to see her in Dr. Ch clinic in 6 months with plain x-rays and flexion-extension.  However I have also sent a message to Dr. Ch to ask her if she would like different imaging.  I have discussed all this with the patient.  Including my reassurance that I do not think there is anything of real concern on the images.  And I would not change any of her habits or routines at this moment    *  All the  patient's questions have been answered and they demonstrate good understanding of the above.    *   The patient has our contact information and is aware that she should call if she has questions comments or concerns.     We appreciate the opportunity to be of service in the care of this pleasant patient.  Please do call if there is anything more we can do    Joya Carvalho PA-C  [facility]  Department of Neurosurgery  Phone: [phone]  Fax: [phone]    This note was generated using voice recognition software. While edited for content some inaccurate phrasing may be found she does not have to use her walker anymore.

## 2019-07-25 ENCOUNTER — ANCILLARY PROCEDURE (OUTPATIENT)
Dept: GENERAL RADIOLOGY | Facility: CLINIC | Age: 64
End: 2019-07-25
Payer: COMMERCIAL

## 2019-07-25 ENCOUNTER — OFFICE VISIT (OUTPATIENT)
Dept: NEUROSURGERY | Facility: CLINIC | Age: 64
End: 2019-07-25
Payer: COMMERCIAL

## 2019-07-25 VITALS
SYSTOLIC BLOOD PRESSURE: 102 MMHG | HEART RATE: 68 BPM | RESPIRATION RATE: 16 BRPM | DIASTOLIC BLOOD PRESSURE: 67 MMHG | OXYGEN SATURATION: 97 % | BODY MASS INDEX: 28.58 KG/M2 | WEIGHT: 151.4 LBS | HEIGHT: 61 IN | TEMPERATURE: 97.8 F

## 2019-07-25 DIAGNOSIS — Z98.1 S/P LUMBAR FUSION: ICD-10-CM

## 2019-07-25 DIAGNOSIS — M43.16 SPONDYLOLISTHESIS, LUMBAR REGION: ICD-10-CM

## 2019-07-25 DIAGNOSIS — Z98.1 S/P LUMBAR FUSION: Primary | ICD-10-CM

## 2019-07-25 ASSESSMENT — MIFFLIN-ST. JEOR: SCORE: 1187

## 2019-07-25 NOTE — LETTER
RE: Kate Hartman  [address] [address]     Dear Colleague,    Thank you for referring your patient, Kate Hartman, to the [facility] NEUROSURGERY at [facility]. Please see a copy of my visit note below.    Neurosurgery clinic post-op follow-up  Date of visit:  07/25/19    Procedure:    07/24/2018 Dima Dale  DIAGNOSES:     1.  L4 to L5 spondylolisthesis with radiculopathy.   2.  Flat back syndrome with pelvic incidence to lumbar lordosis mismatch.       PROCEDURE PERFORMED:   1.  Stealth image-guided L4 to L5 posterior instrumented fusion, modifier 22 for twice normal procedural length due to difficult anatomy with triangulated L5 vertebral body and severely hypertrophic facets.   2.  Insertion of bilateral intervertebral fusion device, L4 to L5, with local harvest autograft.   3.  Bilateral Rodriguez-Cespedes osteotomies, L4 to L5, for kyphosis deformity correction with decompressive bilateral foraminotomies and laminectomies  4.  Bilateral L4 to L5 posterior lateral fusion with local harvest autograft and allograft.       INDICATIONS FOR SURGERY:  Kate Hartman is a very pleasant 62-year-old woman who presented with low back pain and right leg pain and was found to have a grade I, L4 to L5 spondylolisthesis with severe lateral recess and foraminal stenosis, refractory to conservative management.  She had a pelvic incidence of 63 degrees and a lumbar lordosis of 45 degrees for a mismatch of 18 degrees.  She also had a synovial cyst on the right at L4 to L5 causing lateral recess compression.  The patient had a non-symptomatic cervical stenosis, for which we planned to use neural monitoring to ensure no worsening with prone positioning.  After a full discussion of risks versus benefits, she elected to proceed with surgery for L4 to L5 posterior instrumented fusion with interbody fusion and Rodriguez-Cespedes osteotomies for correction of kyphosis at the L4  to L5 level.        HPI: doing well. Denies significant back or leg pain    Past Medical History:   Diagnosis Date     Arthritis      Hypertension      PONV (postoperative nausea and vomiting)      Past Surgical History:   Procedure Laterality Date     ANKLE SURGERY       C BREAST AUGMENTATION       OPTICAL TRACKING SYSTEM FUSION POSTERIOR SPINE LUMBAR N/A 2018    Procedure: OPTICAL TRACKING SYSTEM FUSION SPINE POSTERIOR LUMBAR ONE LEVEL;  O-Arm/Stealth Assisted Lumbar 4-5 Decompression And Bilateral Transforaminal Interbody Fusion With Rodriguez-Cespedes Osteotomies, Autograft,  Allograft, Neuromonitoring Due To Cervical Spine Stenosis;  Surgeon: Fartun Ch MD;  Location: [facility] OR     ROTATOR CUFF REPAIR RT/LT       Social History     Socioeconomic History     Marital status:      Spouse name: Not on file     Number of children: Not on file     Years of education: Not on file     Highest education level: Not on file   Occupational History     Not on file   Social Needs     Financial resource strain: Not on file     Food insecurity:     Worry: Not on file     Inability: Not on file     Transportation needs:     Medical: Not on file     Non-medical: Not on file   Tobacco Use     Smoking status: Former Smoker     Packs/day: 1.00     Years: 20.00     Pack years: 20.00     Last attempt to quit: 2018     Years since quittin.2     Smokeless tobacco: Never Used   Substance and Sexual Activity     Alcohol use: Yes     Alcohol/week: 5.4 oz     Types: 9 Glasses of wine per week     Drug use: No     Sexual activity: Never   Lifestyle     Physical activity:     Days per week: Not on file     Minutes per session: Not on file     Stress: Not on file   Relationships     Social connections:     Talks on phone: Not on file     Gets together: Not on file     Attends [employer] service: Not on file     Active member of club or organization: Not on file     Attends meetings of clubs or organizations: Not on  "file     Relationship status: Not on file     Intimate partner violence:     Fear of current or ex partner: Not on file     Emotionally abused: Not on file     Physically abused: Not on file     Forced sexual activity: Not on file   Other Topics Concern     Not on file   Social History Narrative     Not on file     No family history on file.      IMAGING:  xrays 07/25/19 show no instrumentation loosening and stable alignment     EXAM:  /67 (BP Location: Left arm, Patient Position: Sitting, Cuff Size: Adult Regular)   Pulse 68   Temp 97.8  F (36.6  C) (Oral)   Resp 16   Ht 1.554 m (5' 1.18\")   Wt 68.7 kg (151 lb 6.4 oz)   SpO2 97%   BMI 28.44 kg/m       5/5 BLE, incision well-healed     Assessment/Plan:     Kate Hartman is a 63 year old female doing well at 12 months postop from L4-L5 TLIF    PLAN:  Lifting restriction 50 pounds lifelong  followup prn    Fartun Ch MD    [facility] Department of Neurosurgery  Office: [phone]    "

## 2020-03-11 ENCOUNTER — HEALTH MAINTENANCE LETTER (OUTPATIENT)
Age: 65
End: 2020-03-11

## 2020-07-09 NOTE — IP AVS SNAPSHOT
MRN:[id number]                      After Visit Summary   7/24/2018    Kate Hartman    MRN: [id number]           Thank you!     Thank you for choosing [facility] for your care. Our goal is always to provide you with excellent care. Hearing back from our patients is one way we can continue to improve our services. Please take a few minutes to complete the written survey that you may receive in the mail after you visit with us. Thank you!        Patient Information     Date Of Birth          1955        Designated Caregiver       Most Recent Value    Caregiver    Will someone help with your care after discharge? yes    Name of designated caregiver Dawna    Phone number of caregiver [phone]    Caregiver address [address], [place]      About your hospital stay     You were admitted on:  July 24, 2018 You last received care in the:  [address] [facility]    You were discharged on:  July 26, 2018        Reason for your hospital stay       You underwent surgery for decompression and fusion of your lumbar spine                  Who to Call     For medical emergencies, please call 911.  For non-urgent questions about your medical care, please call your primary care provider or clinic, [phone]  For questions related to your surgery, please call your surgery clinic        Attending Provider     Provider Specialty    Fartun Ch MD Neurosurgery       Primary Care Provider Office Phone # Fax #    Brianna Ghada Guevara [phone]      After Care Instructions     Activity       Your activity upon discharge:  Do not do any bending, twisting, strenuous exercise, or heavy lifting (greater than 10 pounds) for 4-6 weeks. Be careful and ask for assistance when walking or going up and down stairs. Avoid any activities that could result in trauma to the surgical wound. Do not drive within 3 months of having your last seizure or while using narcotics or other  sedating medications, such as sleep aids, muscle relaxants, etc.            Diet       Follow this diet upon discharge: Orders Placed This Encounter      Advance Diet as Tolerated: Regular Diet Adult; Regular Diet Adult            Discharge Instructions       You underwent surgery on your  lumbar spine for fusion by Dr. Fartun Ch.    - You will have follow in 2 weeks with either our nurse practitioner or your  primary care provider for a wound check, then at 6 weeks and 3 months with your surgeon. You will have follow up Xrays at 6 weeks and at 3 months you will have a CT scan prior to your appointment for the surgeon to review.      - If you have not heard from our clinic about your follow up visit by 3-4 days following your discharge, please call our clinic at [phone] to schedule an appointment with the Neurosurgery teams.     - Please avoid taking medications like Advil, Motrin, Ibuprofen, Celebrex, Aleve or aspirin for at least 3 months as these medications can slow the healing and fusion of your spine.      After discharge, your activity restrictions are:   -We encourage short frequent walks, increasing as tolerated.  - Avoid housework, vacuuming, laundry, leaf raking, lawn mowing and snow removal.   - No driving until you are seen in clinic and cleared by your neurosurgeon. You should not drive while on narcotic pain medication.   - No strenuous activity.  - No lifting more than 10 pounds until you are seen in clinic (a gallon of milk weighs approximately 8 pounds)  - You are ok to shower, but do not soak your incisions. Pat them dry if they get damp.   - Avoid coloring your hair or permanent styling until cleared by your surgeon  - No baths, hot tubs or pools for 4-6 weeks after surgery.       Wound cares after surgery  - You are ok to shower, but do not soak your incisions. Pat them dry if they get damp.   - Avoid coloring your hair or permanent styling until cleared by your surgeon  - No baths,  hot tubs or pools for 4-6 weeks after surgery.       Call if you have any of the followin. Temperature greater than 101.5 F.   2. Any redness, swelling or discharge from the wound.   3. Any new weakness, numbness or altered mental status.  4. Worsening pain that is not improving with the pain medications you were prescribed.     Call [phone] or after 5:00 pm or on weekends call [phone] and ask for the neurosurgery resident on call. Thank You.            Wound care and dressings       Instructions to care for your wound at home:   You should remove your dressings and bandages on post-operative day #2. You should then keep the wound undressed and open to air. You are allowed to take showers and get the wound wet starting on post-operative day #3 but you may not scrub or soak the wound or keep it submerged under water. If you do happen to get the wound wet, be sure to pat dry it rather than scrubbing it with a towel.                  Follow-up Appointments     Adult [facility] Follow-up and recommended labs and tests       Follow up with Joya Carvalho PA-C in 2 weeks for wound check     Appointments on [facility] and/or [facility] (with [facility] or [facility] provider or service). Call [phone] if you haven't heard regarding these appointments within 7 days of discharge.                  Your next 10 appointments already scheduled     Aug 07, 2018  1:30 PM CDT   (Arrive by 1:15 PM)   Return Visit with Joya Carvalho PA-C   [facility] Neurosurgery ([facility])    [address] [place] [id number] [phone]              Additional Services     Home Care PT Referral for Hospital Discharge       PT to eval and treat    Your provider has ordered home care - physical therapy. If you have not been contacted within 2 days of your discharge please call the department phone number listed on the top of this document.                  Pending Results     No  "orders found from 7/22/2018 to 7/25/2018.            Statement of Approval     Ordered          07/26/18 1416  I have reviewed and agree with all the recommendations and orders detailed in this document.  EFFECTIVE NOW     Approved and electronically signed by:  Shazia Guzman APRN CNP             Admission Information     Date & Time Provider Department Dept. Phone    7/24/2018 Fartun Ch MD [address] [facility] [phone]      Your Vitals Were     Blood Pressure Pulse Temperature Respirations Height Weight    91/56 (BP Location: Right arm) 65 96.8  F (36  C) (Oral) 16 1.549 m (5' 1\") 67.4 kg (148 lb 9.4 oz)    Pulse Oximetry BMI (Body Mass Index)                98% 28.08 kg/m2          MyChart Information     [facility] gives you secure access to your electronic health record. If you see a primary care provider, you can also send messages to your care team and make appointments. If you have questions, please call your primary care clinic.  If you do not have a primary care provider, please call [phone] and they will assist you.        Care EveryWhere ID     This is your Care EveryWhere ID. This could be used by other organizations to access your [place] medical records  [id number]        Equal Access to Services     BRIEN BOSS AH: Azalea figueroao Sowinifredali, waaxda luqadaha, qaybta kaalmada adeegyada, sol jimenez. So [facility] [phone].    ATENCIÓN: Si habla español, tiene a burnett disposición servicios gratuitos de asistencia lingüística. Llame al [phone].    We comply with applicable federal civil rights laws and [place] laws. We do not discriminate on the basis of race, color, national origin, age, disability, sex, sexual orientation, or gender identity.               Review of your medicines      START taking        Dose / Directions    cyclobenzaprine 10 MG tablet   Commonly known as:  FLEXERIL        Dose:  10 mg   Take 1 tablet (10 " mg) by mouth 3 times daily   Quantity:  45 tablet   Refills:  0       oxyCODONE IR 5 MG tablet   Commonly known as:  ROXICODONE        Dose:  5-10 mg   Take 1-2 tablets (5-10 mg) by mouth every 3 hours as needed for other (pain control or improvement in physical function. Hold dose for analgesic side effects.)   Quantity:  75 tablet   Refills:  0       polyethylene glycol Packet   Commonly known as:  MIRALAX/GLYCOLAX        Dose:  17 g   Take 17 g by mouth 3 times daily   Quantity:  30 packet   Refills:  0       senna-docusate 8.6-50 MG per tablet   Commonly known as:  SENOKOT-S;PERICOLACE        Dose:  3 tablet   Take 3 tablets by mouth 2 times daily   Quantity:  100 tablet   Refills:  0         CONTINUE these medicines which have NOT CHANGED        Dose / Directions    hydrochlorothiazide 25 MG tablet   Commonly known as:  HYDRODIURIL        Dose:  25 mg   Take 25 mg by mouth every morning   Refills:  0       losartan 50 MG tablet   Commonly known as:  COZAAR        Dose:  50 mg   Take 50 mg by mouth every morning   Refills:  0       metoprolol tartrate 50 MG tablet   Commonly known as:  LOPRESSOR        Dose:  50 mg   Take 50 mg by mouth 2 times daily   Refills:  0       Multi-vitamin Tabs tablet        Dose:  1 tablet   Take 1 tablet by mouth every morning   Refills:  0       potassium aminobenzoate 500 MG Caps capsule   Indication:  low K        Dose:  1 capsule   Take 1 capsule by mouth 2 times daily   Refills:  0       TYLENOL PO        Dose:  1000 mg   Take 1,000 mg by mouth every 6 hours as needed for mild pain or fever   Refills:  0       VITAMIN D (CHOLECALCIFEROL) PO        Dose:  1000 Units   Take 1,000 Units by mouth every morning   Refills:  0            Where to get your medicines      These medications were sent to [facility] - [place], [place] - [address]  [address] [address]     Phone:  [phone]     cyclobenzaprine 10 MG tablet    polyethylene  glycol Packet    senna-docusate 8.6-50 MG per tablet         Some of these will need a paper prescription and others can be bought over the counter. Ask your nurse if you have questions.     Bring a paper prescription for each of these medications     oxyCODONE IR 5 MG tablet                Protect others around you: Learn how to safely use, store and throw away your medicines at www.disposemymeds.org.        Information about OPIOIDS     PRESCRIPTION OPIOIDS: WHAT YOU NEED TO KNOW   We gave you an opioid (narcotic) pain medicine. It is important to manage your pain, but opioids are not always the best choice. You should first try all the other options your care team gave you. Take this medicine for as short a time (and as few doses) as possible.     These medicines have risks:    DO NOT drive when on new or higher doses of pain medicine. These medicines can affect your alertness and reaction times, and you could be arrested for driving under the influence (DUI). If you need to use opioids long-term, talk to your care team about driving.    DO NOT operate heave machinery    DO NOT do any other dangerous activities while taking these medicines.     DO NOT drink any alcohol while taking these medicines.      If the opioid prescribed includes acetaminophen, DO NOT take with any other medicines that contain acetaminophen. Read all labels carefully. Look for the word  acetaminophen  or  Tylenol.  Ask your pharmacist if you have questions or are unsure.    You can get addicted to pain medicines, especially if you have a history of addiction (chemical, alcohol or substance dependence). Talk to your care team about ways to reduce this risk.    Store your pills in a secure place, locked if possible. We will not replace any lost or stolen medicine. If you don t finish your medicine, please throw away (dispose) as directed by your pharmacist. The [place] [employer]ution [employer] has more information about safe disposal:  https://www.pca.[facility].[place].us/living-green/managing-unwanted-medications.     All opioids tend to cause constipation. Drink plenty of water and eat foods that have a lot of fiber, such as fruits, vegetables, prune juice, apple juice and high-fiber cereal. Take a laxative (Miralax, milk of magnesia, Colace, Senna) if you don t move your bowels at least every other day.              Medication List: This is a list of all your medications and when to take them. Check marks below indicate your daily home schedule. Keep this list as a reference.      Medications           Morning Afternoon Evening Bedtime As Needed    cyclobenzaprine 10 MG tablet   Commonly known as:  FLEXERIL   Take 1 tablet (10 mg) by mouth 3 times daily   Last time this was given:  10 mg on 7/26/2018  3:29 PM                                hydrochlorothiazide 25 MG tablet   Commonly known as:  HYDRODIURIL   Take 25 mg by mouth every morning   Last time this was given:  25 mg on 7/26/2018  8:14 AM                                losartan 50 MG tablet   Commonly known as:  COZAAR   Take 50 mg by mouth every morning   Last time this was given:  50 mg on 7/26/2018  8:14 AM                                metoprolol tartrate 50 MG tablet   Commonly known as:  LOPRESSOR   Take 50 mg by mouth 2 times daily   Last time this was given:  50 mg on 7/26/2018  8:15 AM                                Multi-vitamin Tabs tablet   Take 1 tablet by mouth every morning   Last time this was given:  1 tablet on 7/26/2018  8:15 AM                                oxyCODONE IR 5 MG tablet   Commonly known as:  ROXICODONE   Take 1-2 tablets (5-10 mg) by mouth every 3 hours as needed for other (pain control or improvement in physical function. Hold dose for analgesic side effects.)   Last time this was given:  10 mg on 7/26/2018  3:29 PM                                polyethylene glycol Packet   Commonly known as:  MIRALAX/GLYCOLAX   Take 17 g by mouth 3 times daily   Last time  this was given:  17 g on 7/26/2018  1:00 PM                                potassium aminobenzoate 500 MG Caps capsule   Take 1 capsule by mouth 2 times daily                                senna-docusate 8.6-50 MG per tablet   Commonly known as:  SENOKOT-S;PERICOLACE   Take 3 tablets by mouth 2 times daily   Last time this was given:  3 tablets on 7/26/2018  8:15 AM                                TYLENOL PO   Take 1,000 mg by mouth every 6 hours as needed for mild pain or fever   Last time this was given:  975 mg on 7/26/2018  3:29 PM                                VITAMIN D (CHOLECALCIFEROL) PO   Take 1,000 Units by mouth every morning   Last time this was given:  1,000 Units on 7/26/2018  8:14 AM                                   >4

## 2021-01-03 ENCOUNTER — HEALTH MAINTENANCE LETTER (OUTPATIENT)
Age: 66
End: 2021-01-03

## 2021-04-25 ENCOUNTER — HEALTH MAINTENANCE LETTER (OUTPATIENT)
Age: 66
End: 2021-04-25

## 2021-10-10 ENCOUNTER — HEALTH MAINTENANCE LETTER (OUTPATIENT)
Age: 66
End: 2021-10-10

## 2022-01-29 ENCOUNTER — HEALTH MAINTENANCE LETTER (OUTPATIENT)
Age: 67
End: 2022-01-29

## 2022-03-26 ENCOUNTER — HEALTH MAINTENANCE LETTER (OUTPATIENT)
Age: 67
End: 2022-03-26

## 2022-09-18 ENCOUNTER — HEALTH MAINTENANCE LETTER (OUTPATIENT)
Age: 67
End: 2022-09-18

## 2023-05-06 ENCOUNTER — HEALTH MAINTENANCE LETTER (OUTPATIENT)
Age: 68
End: 2023-05-06

## 2023-12-06 NOTE — OR NURSING
Assigned to patient but pt has not been roomed yet.    Sinus tachycardia, ST depression with TWI inferior leads

## (undated) DEVICE — SPONGE COTTONOID 1X3" 20-10S

## (undated) DEVICE — GLOVE PROTEXIS BLUE W/NEU-THERA 7.5  2D73EB75

## (undated) DEVICE — DRSG AQUACEL AG 3.5X9.75" HYDROFIBER 412011

## (undated) DEVICE — SUCTION MANIFOLD DORNOCH ULTRA CART UL-CL500

## (undated) DEVICE — MARKER SPHERES PASSIVE MEDT PACK 5 8801075

## (undated) DEVICE — PACK NEURO MINOR UMMC SNE32MNMU4

## (undated) DEVICE — DRSG PRIMAPORE 03 1/8X6" 66000318

## (undated) DEVICE — ESU GROUND PAD ADULT W/CORD E7507

## (undated) DEVICE — SU VICRYL 0 CT-1 CR 8X27" UND JJ41G

## (undated) DEVICE — SPONGE SURGIFOAM 100 1974

## (undated) DEVICE — SU ETHILON 3-0 PS-1 18" 1663H

## (undated) DEVICE — IMP SCR MEDT 5.5/6.0MM SOLERA 6.5X45MM MA 55840006545: Type: IMPLANTABLE DEVICE | Site: SPINE LUMBAR | Status: NON-FUNCTIONAL

## (undated) DEVICE — SYR EAR BULB 3OZ 0035830

## (undated) DEVICE — KIT PATIENT CARE PROAXIS SYSTEM 6988-PV-ACP

## (undated) DEVICE — BRUSH SURGICAL SCRUB W/4% CHG SOL 25ML 371073

## (undated) DEVICE — CUP AND LID 4OZ STERILE SSK9008A

## (undated) DEVICE — PACK GOWN 3/PK DISP XL SBA32GPFCB

## (undated) DEVICE — SU MONOCRYL 4-0 PS-2 27" UND Y426H

## (undated) DEVICE — SYR 10ML LL W/O NDL 302995

## (undated) DEVICE — DRSG GAUZE 4X4" TRAY 6939

## (undated) DEVICE — ESU CORD BIPOLAR GREEN 10-4000

## (undated) DEVICE — DRAPE SHEET MED 44X70" 9355

## (undated) DEVICE — DRAIN HEMOVAC RESERVOIR KIT 10FR 1/8" MED 00-2550-002-10

## (undated) DEVICE — DRSG TEGADERM 4X4 3/4" 1626W

## (undated) DEVICE — SU DERMABOND ADVANCED .7ML DNX12

## (undated) DEVICE — KIT PATIENT POSITIONING PIGAZZI LATEX FREE 40580

## (undated) DEVICE — BUR MATCHSTICK 3MM ANSPACH L-8NS-G1

## (undated) DEVICE — PREP CHLORAPREP CLEAR 3ML 260400

## (undated) DEVICE — SYR 30ML LL W/O NDL 302832

## (undated) DEVICE — SYR 03ML LL W/O NDL 309657

## (undated) DEVICE — SPONGE LAP 18X18" X8435

## (undated) DEVICE — CATH TRAY FOLEY SURESTEP 16FR W/URNE MTR STLK LATEX A303316A

## (undated) DEVICE — SOL WATER IRRIG 1000ML BOTTLE 2F7114

## (undated) DEVICE — IMP SCR MEDT 5.5/6.0MM SOLERA 7.5X50MM MA 55840007550: Type: IMPLANTABLE DEVICE | Site: SPINE LUMBAR | Status: NON-FUNCTIONAL

## (undated) DEVICE — WIPES FOLEY CARE SURESTEP PROVON DFC100

## (undated) DEVICE — DRAPE O ARM TUBE 9732722

## (undated) DEVICE — IOM SUPPLIES

## (undated) DEVICE — Device

## (undated) DEVICE — ESU PENCIL W/COATED BLADE E2450H

## (undated) DEVICE — LINEN TOWEL PACK X30 5481

## (undated) DEVICE — SPONGE COTTONOID 1/2X1/2" 20-04S

## (undated) DEVICE — LIGHT HANDLE X1 31140133

## (undated) DEVICE — GLOVE PROTEXIS MICRO 7.0  2D73PM70

## (undated) DEVICE — ESU ELEC BLADE 2.75" COATED/INSULATED E1455

## (undated) DEVICE — SU VICRYL 2-0 CT-2 CR 8X18" J726D

## (undated) DEVICE — CELL SAVER

## (undated) DEVICE — SU VICRYL 0 UR-6 27" J603H

## (undated) DEVICE — PREP CHLORAPREP 26ML TINTED ORANGE  260815

## (undated) DEVICE — SU VICRYL 2-0 CT-2 27" J333H

## (undated) DEVICE — SUCTION TIP YANKAUER STR K87

## (undated) DEVICE — BLADE BONE MILL STRK 3.2MM FINE 5400-702-000

## (undated) DEVICE — SPONGE COTTONOID 1/2X1 1/2" 20-06S

## (undated) DEVICE — DRSG GAUZE 2X2" 8042

## (undated) DEVICE — DRAPE C-ARMOR 5 SIDED 5523

## (undated) DEVICE — LINEN TOWEL PACK X6 WHITE 5487

## (undated) RX ORDER — SODIUM CHLORIDE 9 MG/ML
INJECTION, SOLUTION INTRAVENOUS
Status: DISPENSED
Start: 2018-07-24

## (undated) RX ORDER — POTASSIUM CL/LIDO/0.9 % NACL 10MEQ/0.1L
INTRAVENOUS SOLUTION, PIGGYBACK (ML) INTRAVENOUS
Status: DISPENSED
Start: 2018-07-24

## (undated) RX ORDER — POTASSIUM CHLORIDE 29.8 MG/ML
INJECTION INTRAVENOUS
Status: DISPENSED
Start: 2018-07-24

## (undated) RX ORDER — BUPIVACAINE HYDROCHLORIDE AND EPINEPHRINE 5; 5 MG/ML; UG/ML
INJECTION, SOLUTION EPIDURAL; INTRACAUDAL; PERINEURAL
Status: DISPENSED
Start: 2018-07-24

## (undated) RX ORDER — FENTANYL CITRATE 50 UG/ML
INJECTION, SOLUTION INTRAMUSCULAR; INTRAVENOUS
Status: DISPENSED
Start: 2018-07-24

## (undated) RX ORDER — HYDROMORPHONE HYDROCHLORIDE 1 MG/ML
INJECTION, SOLUTION INTRAMUSCULAR; INTRAVENOUS; SUBCUTANEOUS
Status: DISPENSED
Start: 2018-07-24

## (undated) RX ORDER — ONDANSETRON 2 MG/ML
INJECTION INTRAMUSCULAR; INTRAVENOUS
Status: DISPENSED
Start: 2018-07-24

## (undated) RX ORDER — HYDROMORPHONE HCL/0.9% NACL/PF 0.2MG/0.2
SYRINGE (ML) INTRAVENOUS
Status: DISPENSED
Start: 2018-07-24

## (undated) RX ORDER — ACETAMINOPHEN 325 MG/1
TABLET ORAL
Status: DISPENSED
Start: 2018-07-24

## (undated) RX ORDER — CEFAZOLIN SODIUM 2 G/100ML
INJECTION, SOLUTION INTRAVENOUS
Status: DISPENSED
Start: 2018-07-24

## (undated) RX ORDER — SCOLOPAMINE TRANSDERMAL SYSTEM 1 MG/1
PATCH, EXTENDED RELEASE TRANSDERMAL
Status: DISPENSED
Start: 2018-07-24